# Patient Record
Sex: MALE | Race: WHITE | NOT HISPANIC OR LATINO | Employment: OTHER
[De-identification: names, ages, dates, MRNs, and addresses within clinical notes are randomized per-mention and may not be internally consistent; named-entity substitution may affect disease eponyms.]

---

## 2023-01-17 ENCOUNTER — TELEMEDICINE (OUTPATIENT)
Dept: TELEHEALTH | Facility: TELEMEDICINE | Age: 68
End: 2023-01-17
Payer: MEDICARE

## 2023-01-17 VITALS — WEIGHT: 205 LBS | BODY MASS INDEX: 29.35 KG/M2 | HEIGHT: 70 IN

## 2023-01-17 DIAGNOSIS — Z76.0 MEDICATION REFILL: ICD-10-CM

## 2023-01-17 DIAGNOSIS — U07.1 COVID-19: ICD-10-CM

## 2023-01-17 PROCEDURE — 99203 OFFICE O/P NEW LOW 30 MIN: CPT | Mod: 95 | Performed by: STUDENT IN AN ORGANIZED HEALTH CARE EDUCATION/TRAINING PROGRAM

## 2023-01-17 RX ORDER — ALBUTEROL SULFATE 90 UG/1
2 AEROSOL, METERED RESPIRATORY (INHALATION) EVERY 6 HOURS PRN
Qty: 8.5 G | Refills: 1 | Status: ON HOLD | OUTPATIENT
Start: 2023-01-17 | End: 2023-03-07

## 2023-01-17 RX ORDER — BUDESONIDE AND FORMOTEROL FUMARATE DIHYDRATE 80; 4.5 UG/1; UG/1
2 AEROSOL RESPIRATORY (INHALATION) 2 TIMES DAILY PRN
Qty: 10.2 G | Refills: 0 | Status: ON HOLD | OUTPATIENT
Start: 2023-01-17 | End: 2023-03-07

## 2023-01-17 NOTE — PROGRESS NOTES
"Subjective:   Keyur Fernandez is a 67 y.o. male who presents for Coronavirus Screening (Tested positive sunday)      HPI:  Pleasant 67-year-old male presents to the urgent care via virtual visit for positive COVID-19 diagnosed on 1/15/2023.  Symptoms started that same day.  He reports dry cough, fatigue, intermittent headache, sore throat, and nasal congestion.  He did have a fever the other day of 102.0 °F.  He is taking OTC cold medication.  Fever does respond to to antipyretics.  He also presents for medication refill of albuterol and Symbicort for his asthma.  He states his asthma does tend to flareup with upper respiratory tract infections.  He denies any liver or kidney dysfunction.  He does not take any statins.  The only current prescription medication that he is taking at this time is Keflex for a skin infection.  Denies ear pain, ear discharge, chest pain, palpitations, lower leg swelling, lower leg pain, production cough, shortness of breath, wheezing, nausea, vomiting, abdominal pain, diarrhea, dizziness.      Medications:    albuterol Aers  budesonide-formoterol Aero  Nirmatrelvir&Ritonavir 300/100 Tbpk    Allergies: Patient has no allergy information on record.    Problem List: Keyur Fernandez does not have a problem list on file.    Surgical History:  No past surgical history on file.    Past Social Hx: Keyur Fernandez  reports that he has never smoked. He has never used smokeless tobacco.     Past Family Hx:  Keyur Fernandez family history is not on file.     Problem list, medications, and allergies reviewed by myself today in Epic.     Objective:     Ht 1.778 m (5' 10\")   Wt 93 kg (205 lb)   BMI 29.41 kg/m²     Physical Exam  Vitals reviewed.   Constitutional:       General: He is not in acute distress.     Appearance: Normal appearance.      Comments: Physical exam limited due to virtual visit.   HENT:      Head: Normocephalic.      Right Ear: External ear normal.      Nose: Congestion present. No rhinorrhea.    "   Mouth/Throat:      Mouth: Mucous membranes are moist.   Eyes:      Conjunctiva/sclera: Conjunctivae normal.      Pupils: Pupils are equal, round, and reactive to light.   Pulmonary:      Effort: Pulmonary effort is normal.   Musculoskeletal:      Cervical back: Normal range of motion.   Neurological:      Mental Status: He is alert and oriented to person, place, and time.       Assessment/Plan:     Diagnosis and associated orders:     1. COVID-19  Nirmatrelvir&Ritonavir 300/100 20 x 150 MG & 10 x 100MG Tablet Therapy Pack      2. Medication refill  albuterol 108 (90 Base) MCG/ACT Aero Soln inhalation aerosol    budesonide-formoterol (SYMBICORT) 80-4.5 MCG/ACT Aerosol         Comments/MDM:     Patient presents with positive for COVID-19 that started 2 and half days ago.  Patient has no active prescription medications that would interfere with antivirals.  Denies history of kidney or liver dysfunction.  He is not taking any statins.  Discussed antivirals with Paxlovid.  Patient does have a history of asthma that is typically well controlled.  He is a candidate for this medication.  He would like to pursue antivirals at this time.  We did discuss the risks of this medication and the benefits.  Patient is agreeable to the plan.  No labs available showing contraindication to this medication.  He is aware that this is an emergency authorized medication.  He would also like refills of his albuterol and Symbicort.  Both his medications were prescribed at this time.  Strict ED/return precautions were given.  Patient was advised to present in person for evaluation if he starts experiencing any chest pain, shortness of breath, or worsening of his symptoms despite antivirals.  Patient is agreeable this.       This evaluation was conducted via Zoom using secure and encrypted videoconferencing technology. The patient was in their home in the [facility].    The patient's identity was confirmed and verbal consent was  obtained for this virtual visit.     Differential diagnosis, natural history, supportive care, and indications for immediate follow-up discussed.    Advised the patient to follow-up with the primary care physician for recheck, reevaluation, and consideration of further management.    Please note that this dictation was created using voice recognition software. I have made a reasonable attempt to correct obvious errors, but I expect that there are errors of grammar and possibly content that I did not discover before finalizing the note.    Electronically signed by Filiberto Larson PA-C.

## 2023-02-18 ENCOUNTER — HOSPITAL ENCOUNTER (INPATIENT)
Facility: MEDICAL CENTER | Age: 68
LOS: 1 days | DRG: 661 | End: 2023-02-18
Attending: STUDENT IN AN ORGANIZED HEALTH CARE EDUCATION/TRAINING PROGRAM | Admitting: STUDENT IN AN ORGANIZED HEALTH CARE EDUCATION/TRAINING PROGRAM
Payer: MEDICARE

## 2023-02-18 ENCOUNTER — ANESTHESIA EVENT (OUTPATIENT)
Dept: SURGERY | Facility: MEDICAL CENTER | Age: 68
DRG: 661 | End: 2023-02-18
Payer: MEDICARE

## 2023-02-18 ENCOUNTER — ANESTHESIA (OUTPATIENT)
Dept: SURGERY | Facility: MEDICAL CENTER | Age: 68
DRG: 661 | End: 2023-02-18
Payer: MEDICARE

## 2023-02-18 ENCOUNTER — APPOINTMENT (OUTPATIENT)
Dept: RADIOLOGY | Facility: MEDICAL CENTER | Age: 68
DRG: 661 | End: 2023-02-18
Attending: UROLOGY
Payer: MEDICARE

## 2023-02-18 ENCOUNTER — APPOINTMENT (OUTPATIENT)
Dept: RADIOLOGY | Facility: MEDICAL CENTER | Age: 68
DRG: 661 | End: 2023-02-18
Attending: STUDENT IN AN ORGANIZED HEALTH CARE EDUCATION/TRAINING PROGRAM
Payer: MEDICARE

## 2023-02-18 VITALS
RESPIRATION RATE: 17 BRPM | SYSTOLIC BLOOD PRESSURE: 97 MMHG | TEMPERATURE: 97.5 F | BODY MASS INDEX: 29.98 KG/M2 | OXYGEN SATURATION: 96 % | DIASTOLIC BLOOD PRESSURE: 58 MMHG | HEART RATE: 70 BPM | HEIGHT: 70 IN | WEIGHT: 209.44 LBS

## 2023-02-18 DIAGNOSIS — N17.9 AKI (ACUTE KIDNEY INJURY) (HCC): ICD-10-CM

## 2023-02-18 DIAGNOSIS — N20.1 URETEROLITHIASIS: Primary | ICD-10-CM

## 2023-02-18 DIAGNOSIS — R10.9 LEFT FLANK PAIN: ICD-10-CM

## 2023-02-18 PROBLEM — J30.9 ALLERGIC RHINITIS: Status: ACTIVE | Noted: 2023-02-18

## 2023-02-18 PROBLEM — N13.9 OBSTRUCTIVE UROPATHY: Status: ACTIVE | Noted: 2023-02-18

## 2023-02-18 PROBLEM — K21.9 GERD (GASTROESOPHAGEAL REFLUX DISEASE): Status: ACTIVE | Noted: 2023-02-18

## 2023-02-18 PROBLEM — N20.0 NEPHROLITHIASIS: Status: ACTIVE | Noted: 2023-02-18

## 2023-02-18 PROBLEM — J45.909 ASTHMA: Status: ACTIVE | Noted: 2023-02-18

## 2023-02-18 PROBLEM — D72.829 LEUKOCYTOSIS: Status: ACTIVE | Noted: 2023-02-18

## 2023-02-18 LAB
ALBUMIN SERPL BCP-MCNC: 4.3 G/DL (ref 3.2–4.9)
ALBUMIN SERPL BCP-MCNC: 4.5 G/DL (ref 3.2–4.9)
ALBUMIN/GLOB SERPL: 1.9 G/DL
ALP SERPL-CCNC: 86 U/L (ref 30–99)
ALT SERPL-CCNC: 25 U/L (ref 2–50)
ANION GAP SERPL CALC-SCNC: 14 MMOL/L (ref 7–16)
APPEARANCE UR: CLEAR
AST SERPL-CCNC: 20 U/L (ref 12–45)
BACTERIA #/AREA URNS HPF: NEGATIVE /HPF
BASOPHILS # BLD AUTO: 0.3 % (ref 0–1.8)
BASOPHILS # BLD: 0.04 K/UL (ref 0–0.12)
BILIRUB SERPL-MCNC: 0.5 MG/DL (ref 0.1–1.5)
BILIRUB UR QL STRIP.AUTO: NEGATIVE
BUN SERPL-MCNC: 21 MG/DL (ref 8–22)
BUN SERPL-MCNC: 22 MG/DL (ref 8–22)
CALCIUM ALBUM COR SERPL-MCNC: 8.8 MG/DL (ref 8.5–10.5)
CALCIUM ALBUM COR SERPL-MCNC: 8.9 MG/DL (ref 8.5–10.5)
CALCIUM SERPL-MCNC: 9.1 MG/DL (ref 8.5–10.5)
CALCIUM SERPL-MCNC: 9.2 MG/DL (ref 8.5–10.5)
CHLORIDE SERPL-SCNC: 105 MMOL/L (ref 96–112)
CHLORIDE SERPL-SCNC: 106 MMOL/L (ref 96–112)
CO2 SERPL-SCNC: 19 MMOL/L (ref 20–33)
CO2 SERPL-SCNC: 23 MMOL/L (ref 20–33)
COLOR UR: YELLOW
CREAT SERPL-MCNC: 1.8 MG/DL (ref 0.5–1.4)
CREAT SERPL-MCNC: 2.05 MG/DL (ref 0.5–1.4)
CRP SERPL HS-MCNC: 0.51 MG/DL (ref 0–0.75)
EOSINOPHIL # BLD AUTO: 0.05 K/UL (ref 0–0.51)
EOSINOPHIL NFR BLD: 0.3 % (ref 0–6.9)
EPI CELLS #/AREA URNS HPF: NEGATIVE /HPF
ERYTHROCYTE [DISTWIDTH] IN BLOOD BY AUTOMATED COUNT: 44.9 FL (ref 35.9–50)
GFR SERPLBLD CREATININE-BSD FMLA CKD-EPI: 35 ML/MIN/1.73 M 2
GFR SERPLBLD CREATININE-BSD FMLA CKD-EPI: 41 ML/MIN/1.73 M 2
GLOBULIN SER CALC-MCNC: 2.3 G/DL (ref 1.9–3.5)
GLUCOSE SERPL-MCNC: 130 MG/DL (ref 65–99)
GLUCOSE SERPL-MCNC: 135 MG/DL (ref 65–99)
GLUCOSE UR STRIP.AUTO-MCNC: NEGATIVE MG/DL
HCT VFR BLD AUTO: 44.8 % (ref 42–52)
HGB BLD-MCNC: 15.5 G/DL (ref 14–18)
HYALINE CASTS #/AREA URNS LPF: ABNORMAL /LPF
IMM GRANULOCYTES # BLD AUTO: 0.08 K/UL (ref 0–0.11)
IMM GRANULOCYTES NFR BLD AUTO: 0.5 % (ref 0–0.9)
KETONES UR STRIP.AUTO-MCNC: 15 MG/DL
LEUKOCYTE ESTERASE UR QL STRIP.AUTO: NEGATIVE
LIPASE SERPL-CCNC: 33 U/L (ref 11–82)
LYMPHOCYTES # BLD AUTO: 1.13 K/UL (ref 1–4.8)
LYMPHOCYTES NFR BLD: 7.4 % (ref 22–41)
MAGNESIUM SERPL-MCNC: 2 MG/DL (ref 1.5–2.5)
MCH RBC QN AUTO: 33.4 PG (ref 27–33)
MCHC RBC AUTO-ENTMCNC: 34.6 G/DL (ref 33.7–35.3)
MCV RBC AUTO: 96.6 FL (ref 81.4–97.8)
MICRO URNS: ABNORMAL
MONOCYTES # BLD AUTO: 0.71 K/UL (ref 0–0.85)
MONOCYTES NFR BLD AUTO: 4.7 % (ref 0–13.4)
NEUTROPHILS # BLD AUTO: 13.19 K/UL (ref 1.82–7.42)
NEUTROPHILS NFR BLD: 86.8 % (ref 44–72)
NITRITE UR QL STRIP.AUTO: NEGATIVE
NRBC # BLD AUTO: 0 K/UL
NRBC BLD-RTO: 0 /100 WBC
PH UR STRIP.AUTO: 5.5 [PH] (ref 5–8)
PHOSPHATE SERPL-MCNC: 2.8 MG/DL (ref 2.5–4.5)
PLATELET # BLD AUTO: 196 K/UL (ref 164–446)
PMV BLD AUTO: 10.7 FL (ref 9–12.9)
POTASSIUM SERPL-SCNC: 4.1 MMOL/L (ref 3.6–5.5)
POTASSIUM SERPL-SCNC: 4.1 MMOL/L (ref 3.6–5.5)
PROCALCITONIN SERPL-MCNC: 0.05 NG/ML
PROT SERPL-MCNC: 6.6 G/DL (ref 6–8.2)
PROT UR QL STRIP: NEGATIVE MG/DL
RBC # BLD AUTO: 4.64 M/UL (ref 4.7–6.1)
RBC # URNS HPF: ABNORMAL /HPF
RBC UR QL AUTO: ABNORMAL
SODIUM SERPL-SCNC: 141 MMOL/L (ref 135–145)
SODIUM SERPL-SCNC: 142 MMOL/L (ref 135–145)
SP GR UR STRIP.AUTO: 1.03
UROBILINOGEN UR STRIP.AUTO-MCNC: 0.2 MG/DL
WBC # BLD AUTO: 15.2 K/UL (ref 4.8–10.8)
WBC #/AREA URNS HPF: ABNORMAL /HPF

## 2023-02-18 PROCEDURE — 99235 HOSP IP/OBS SAME DATE MOD 70: CPT | Performed by: STUDENT IN AN ORGANIZED HEALTH CARE EDUCATION/TRAINING PROGRAM

## 2023-02-18 PROCEDURE — 700102 HCHG RX REV CODE 250 W/ 637 OVERRIDE(OP): Performed by: STUDENT IN AN ORGANIZED HEALTH CARE EDUCATION/TRAINING PROGRAM

## 2023-02-18 PROCEDURE — 96374 THER/PROPH/DIAG INJ IV PUSH: CPT

## 2023-02-18 PROCEDURE — 36415 COLL VENOUS BLD VENIPUNCTURE: CPT

## 2023-02-18 PROCEDURE — A9270 NON-COVERED ITEM OR SERVICE: HCPCS | Performed by: STUDENT IN AN ORGANIZED HEALTH CARE EDUCATION/TRAINING PROGRAM

## 2023-02-18 PROCEDURE — 700111 HCHG RX REV CODE 636 W/ 250 OVERRIDE (IP): Performed by: STUDENT IN AN ORGANIZED HEALTH CARE EDUCATION/TRAINING PROGRAM

## 2023-02-18 PROCEDURE — 160009 HCHG ANES TIME/MIN: Performed by: UROLOGY

## 2023-02-18 PROCEDURE — 160028 HCHG SURGERY MINUTES - 1ST 30 MINS LEVEL 3: Performed by: UROLOGY

## 2023-02-18 PROCEDURE — 700105 HCHG RX REV CODE 258: Performed by: STUDENT IN AN ORGANIZED HEALTH CARE EDUCATION/TRAINING PROGRAM

## 2023-02-18 PROCEDURE — 160002 HCHG RECOVERY MINUTES (STAT): Performed by: UROLOGY

## 2023-02-18 PROCEDURE — 84145 PROCALCITONIN (PCT): CPT

## 2023-02-18 PROCEDURE — 80069 RENAL FUNCTION PANEL: CPT

## 2023-02-18 PROCEDURE — 160039 HCHG SURGERY MINUTES - EA ADDL 1 MIN LEVEL 3: Performed by: UROLOGY

## 2023-02-18 PROCEDURE — 85025 COMPLETE CBC W/AUTO DIFF WBC: CPT

## 2023-02-18 PROCEDURE — 00910 ANES TRANSURETHRAL PX NOS: CPT | Performed by: STUDENT IN AN ORGANIZED HEALTH CARE EDUCATION/TRAINING PROGRAM

## 2023-02-18 PROCEDURE — 87086 URINE CULTURE/COLONY COUNT: CPT

## 2023-02-18 PROCEDURE — C1747 HCHG SHELL REV 278 C1747: HCPCS | Performed by: UROLOGY

## 2023-02-18 PROCEDURE — 99285 EMERGENCY DEPT VISIT HI MDM: CPT

## 2023-02-18 PROCEDURE — 700101 HCHG RX REV CODE 250: Performed by: STUDENT IN AN ORGANIZED HEALTH CARE EDUCATION/TRAINING PROGRAM

## 2023-02-18 PROCEDURE — 74176 CT ABD & PELVIS W/O CONTRAST: CPT

## 2023-02-18 PROCEDURE — 160048 HCHG OR STATISTICAL LEVEL 1-5: Performed by: UROLOGY

## 2023-02-18 PROCEDURE — 0T778DZ DILATION OF LEFT URETER WITH INTRALUMINAL DEVICE, VIA NATURAL OR ARTIFICIAL OPENING ENDOSCOPIC: ICD-10-PCS | Performed by: UROLOGY

## 2023-02-18 PROCEDURE — 160035 HCHG PACU - 1ST 60 MINS PHASE I: Performed by: UROLOGY

## 2023-02-18 PROCEDURE — 770006 HCHG ROOM/CARE - MED/SURG/GYN SEMI*

## 2023-02-18 PROCEDURE — 83735 ASSAY OF MAGNESIUM: CPT

## 2023-02-18 PROCEDURE — 86140 C-REACTIVE PROTEIN: CPT

## 2023-02-18 PROCEDURE — C1769 GUIDE WIRE: HCPCS | Performed by: UROLOGY

## 2023-02-18 PROCEDURE — 83690 ASSAY OF LIPASE: CPT

## 2023-02-18 PROCEDURE — 96376 TX/PRO/DX INJ SAME DRUG ADON: CPT

## 2023-02-18 PROCEDURE — 96375 TX/PRO/DX INJ NEW DRUG ADDON: CPT

## 2023-02-18 PROCEDURE — C2617 STENT, NON-COR, TEM W/O DEL: HCPCS | Performed by: UROLOGY

## 2023-02-18 PROCEDURE — 81001 URINALYSIS AUTO W/SCOPE: CPT

## 2023-02-18 PROCEDURE — 80053 COMPREHEN METABOLIC PANEL: CPT

## 2023-02-18 DEVICE — STENT UROLOGICAL POLARIS 6X26  ULTRA: Type: IMPLANTABLE DEVICE | Site: URETER | Status: FUNCTIONAL

## 2023-02-18 RX ORDER — ALBUTEROL SULFATE 90 UG/1
AEROSOL, METERED RESPIRATORY (INHALATION) PRN
Status: DISCONTINUED | OUTPATIENT
Start: 2023-02-18 | End: 2023-02-18 | Stop reason: SURG

## 2023-02-18 RX ORDER — IPRATROPIUM BROMIDE AND ALBUTEROL SULFATE 2.5; .5 MG/3ML; MG/3ML
3 SOLUTION RESPIRATORY (INHALATION)
Status: DISCONTINUED | OUTPATIENT
Start: 2023-02-18 | End: 2023-02-18 | Stop reason: HOSPADM

## 2023-02-18 RX ORDER — LABETALOL HYDROCHLORIDE 5 MG/ML
10 INJECTION, SOLUTION INTRAVENOUS EVERY 4 HOURS PRN
Status: DISCONTINUED | OUTPATIENT
Start: 2023-02-18 | End: 2023-02-18 | Stop reason: HOSPADM

## 2023-02-18 RX ORDER — GUAIFENESIN/DEXTROMETHORPHAN 100-10MG/5
10 SYRUP ORAL EVERY 6 HOURS PRN
Status: DISCONTINUED | OUTPATIENT
Start: 2023-02-18 | End: 2023-02-18 | Stop reason: HOSPADM

## 2023-02-18 RX ORDER — ESOMEPRAZOLE MAGNESIUM 10 MG/1
1 GRANULE, FOR SUSPENSION, EXTENDED RELEASE ORAL EVERY EVENING
COMMUNITY

## 2023-02-18 RX ORDER — SODIUM CHLORIDE 9 MG/ML
2000 INJECTION, SOLUTION INTRAVENOUS CONTINUOUS
Status: DISCONTINUED | OUTPATIENT
Start: 2023-02-18 | End: 2023-02-18 | Stop reason: HOSPADM

## 2023-02-18 RX ORDER — ONDANSETRON 2 MG/ML
4 INJECTION INTRAMUSCULAR; INTRAVENOUS ONCE
Status: COMPLETED | OUTPATIENT
Start: 2023-02-18 | End: 2023-02-18

## 2023-02-18 RX ORDER — HYDROMORPHONE HYDROCHLORIDE 1 MG/ML
0.1 INJECTION, SOLUTION INTRAMUSCULAR; INTRAVENOUS; SUBCUTANEOUS
Status: DISCONTINUED | OUTPATIENT
Start: 2023-02-18 | End: 2023-02-18 | Stop reason: HOSPADM

## 2023-02-18 RX ORDER — CEPHALEXIN 500 MG/1
500 CAPSULE ORAL 2 TIMES DAILY
COMMUNITY
Start: 2022-12-20 | End: 2023-03-01

## 2023-02-18 RX ORDER — OXYCODONE HCL 5 MG/5 ML
10 SOLUTION, ORAL ORAL
Status: DISCONTINUED | OUTPATIENT
Start: 2023-02-18 | End: 2023-02-18 | Stop reason: HOSPADM

## 2023-02-18 RX ORDER — OXYCODONE HCL 5 MG/5 ML
5 SOLUTION, ORAL ORAL
Status: DISCONTINUED | OUTPATIENT
Start: 2023-02-18 | End: 2023-02-18 | Stop reason: HOSPADM

## 2023-02-18 RX ORDER — OXYCODONE HYDROCHLORIDE 5 MG/1
5 TABLET ORAL
Status: DISCONTINUED | OUTPATIENT
Start: 2023-02-18 | End: 2023-02-18 | Stop reason: HOSPADM

## 2023-02-18 RX ORDER — ALBUTEROL SULFATE 90 UG/1
2 AEROSOL, METERED RESPIRATORY (INHALATION) EVERY 6 HOURS PRN
Status: DISCONTINUED | OUTPATIENT
Start: 2023-02-18 | End: 2023-02-18 | Stop reason: HOSPADM

## 2023-02-18 RX ORDER — OXYBUTYNIN CHLORIDE 10 MG/1
10 TABLET, EXTENDED RELEASE ORAL
Qty: 14 TABLET | Refills: 0 | Status: ON HOLD | OUTPATIENT
Start: 2023-02-18 | End: 2023-03-07

## 2023-02-18 RX ORDER — SODIUM CHLORIDE 9 MG/ML
1000 INJECTION, SOLUTION INTRAVENOUS ONCE
Status: COMPLETED | OUTPATIENT
Start: 2023-02-18 | End: 2023-02-18

## 2023-02-18 RX ORDER — HYDROMORPHONE HYDROCHLORIDE 1 MG/ML
0.5 INJECTION, SOLUTION INTRAMUSCULAR; INTRAVENOUS; SUBCUTANEOUS
Status: DISCONTINUED | OUTPATIENT
Start: 2023-02-18 | End: 2023-02-18 | Stop reason: HOSPADM

## 2023-02-18 RX ORDER — OXYCODONE HYDROCHLORIDE 10 MG/1
10 TABLET ORAL
Status: DISCONTINUED | OUTPATIENT
Start: 2023-02-18 | End: 2023-02-18 | Stop reason: HOSPADM

## 2023-02-18 RX ORDER — HYDROMORPHONE HYDROCHLORIDE 1 MG/ML
1 INJECTION, SOLUTION INTRAMUSCULAR; INTRAVENOUS; SUBCUTANEOUS ONCE
Status: COMPLETED | OUTPATIENT
Start: 2023-02-18 | End: 2023-02-18

## 2023-02-18 RX ORDER — CEFAZOLIN SODIUM 1 G/3ML
INJECTION, POWDER, FOR SOLUTION INTRAMUSCULAR; INTRAVENOUS PRN
Status: DISCONTINUED | OUTPATIENT
Start: 2023-02-18 | End: 2023-02-18 | Stop reason: SURG

## 2023-02-18 RX ORDER — HYDROMORPHONE HYDROCHLORIDE 1 MG/ML
0.2 INJECTION, SOLUTION INTRAMUSCULAR; INTRAVENOUS; SUBCUTANEOUS
Status: DISCONTINUED | OUTPATIENT
Start: 2023-02-18 | End: 2023-02-18 | Stop reason: HOSPADM

## 2023-02-18 RX ORDER — AMOXICILLIN 250 MG
2 CAPSULE ORAL 2 TIMES DAILY
Status: DISCONTINUED | OUTPATIENT
Start: 2023-02-18 | End: 2023-02-18 | Stop reason: HOSPADM

## 2023-02-18 RX ORDER — HYDROMORPHONE HYDROCHLORIDE 1 MG/ML
0.5 INJECTION, SOLUTION INTRAMUSCULAR; INTRAVENOUS; SUBCUTANEOUS ONCE
Status: COMPLETED | OUTPATIENT
Start: 2023-02-18 | End: 2023-02-18

## 2023-02-18 RX ORDER — TADALAFIL 5 MG/1
5 TABLET ORAL PRN
COMMUNITY
Start: 2023-01-14

## 2023-02-18 RX ORDER — TAMSULOSIN HYDROCHLORIDE 0.4 MG/1
0.4 CAPSULE ORAL 2 TIMES DAILY
Status: DISCONTINUED | OUTPATIENT
Start: 2023-02-18 | End: 2023-02-18 | Stop reason: HOSPADM

## 2023-02-18 RX ORDER — SODIUM CHLORIDE, SODIUM LACTATE, POTASSIUM CHLORIDE, AND CALCIUM CHLORIDE .6; .31; .03; .02 G/100ML; G/100ML; G/100ML; G/100ML
500 INJECTION, SOLUTION INTRAVENOUS
Status: DISCONTINUED | OUTPATIENT
Start: 2023-02-18 | End: 2023-02-18 | Stop reason: HOSPADM

## 2023-02-18 RX ORDER — LIDOCAINE HYDROCHLORIDE 20 MG/ML
INJECTION, SOLUTION EPIDURAL; INFILTRATION; INTRACAUDAL; PERINEURAL PRN
Status: DISCONTINUED | OUTPATIENT
Start: 2023-02-18 | End: 2023-02-18 | Stop reason: SURG

## 2023-02-18 RX ORDER — OXYCODONE HYDROCHLORIDE 5 MG/1
5-10 TABLET ORAL EVERY 6 HOURS PRN
Qty: 25 TABLET | Refills: 0 | Status: SHIPPED | OUTPATIENT
Start: 2023-02-18 | End: 2023-02-23

## 2023-02-18 RX ORDER — BISACODYL 10 MG
10 SUPPOSITORY, RECTAL RECTAL
Status: DISCONTINUED | OUTPATIENT
Start: 2023-02-18 | End: 2023-02-18 | Stop reason: HOSPADM

## 2023-02-18 RX ORDER — ACETAMINOPHEN 325 MG/1
650 TABLET ORAL EVERY 6 HOURS PRN
Status: DISCONTINUED | OUTPATIENT
Start: 2023-02-18 | End: 2023-02-18 | Stop reason: HOSPADM

## 2023-02-18 RX ORDER — PHENAZOPYRIDINE HYDROCHLORIDE 100 MG/1
100 TABLET, FILM COATED ORAL 3 TIMES DAILY PRN
Qty: 6 TABLET | Refills: 0 | Status: ON HOLD | COMMUNITY
End: 2023-03-07

## 2023-02-18 RX ORDER — HALOPERIDOL 5 MG/ML
1 INJECTION INTRAMUSCULAR
Status: DISCONTINUED | OUTPATIENT
Start: 2023-02-18 | End: 2023-02-18 | Stop reason: HOSPADM

## 2023-02-18 RX ORDER — ONDANSETRON 4 MG/1
4 TABLET, ORALLY DISINTEGRATING ORAL EVERY 4 HOURS PRN
Status: DISCONTINUED | OUTPATIENT
Start: 2023-02-18 | End: 2023-02-18 | Stop reason: HOSPADM

## 2023-02-18 RX ORDER — POLYETHYLENE GLYCOL 3350 17 G/17G
17 POWDER, FOR SOLUTION ORAL DAILY
Qty: 14 EACH | Refills: 0 | Status: ON HOLD | COMMUNITY
End: 2023-03-07

## 2023-02-18 RX ORDER — DOCUSATE SODIUM 100 MG/1
100 CAPSULE, LIQUID FILLED ORAL 2 TIMES DAILY
Qty: 30 CAPSULE | Refills: 0 | COMMUNITY

## 2023-02-18 RX ORDER — ONDANSETRON 2 MG/ML
INJECTION INTRAMUSCULAR; INTRAVENOUS PRN
Status: DISCONTINUED | OUTPATIENT
Start: 2023-02-18 | End: 2023-02-18 | Stop reason: SURG

## 2023-02-18 RX ORDER — FEXOFENADINE HCL 60 MG/1
60 TABLET, FILM COATED ORAL DAILY
Status: DISCONTINUED | OUTPATIENT
Start: 2023-02-18 | End: 2023-02-18 | Stop reason: HOSPADM

## 2023-02-18 RX ORDER — BUDESONIDE AND FORMOTEROL FUMARATE DIHYDRATE 80; 4.5 UG/1; UG/1
2 AEROSOL RESPIRATORY (INHALATION) 2 TIMES DAILY PRN
Status: DISCONTINUED | OUTPATIENT
Start: 2023-02-18 | End: 2023-02-18 | Stop reason: HOSPADM

## 2023-02-18 RX ORDER — KETOROLAC TROMETHAMINE 30 MG/ML
15 INJECTION, SOLUTION INTRAMUSCULAR; INTRAVENOUS ONCE
Status: COMPLETED | OUTPATIENT
Start: 2023-02-18 | End: 2023-02-18

## 2023-02-18 RX ORDER — DIPHENHYDRAMINE HYDROCHLORIDE 50 MG/ML
12.5 INJECTION INTRAMUSCULAR; INTRAVENOUS
Status: DISCONTINUED | OUTPATIENT
Start: 2023-02-18 | End: 2023-02-18 | Stop reason: HOSPADM

## 2023-02-18 RX ORDER — POLYETHYLENE GLYCOL 3350 17 G/17G
1 POWDER, FOR SOLUTION ORAL
Status: DISCONTINUED | OUTPATIENT
Start: 2023-02-18 | End: 2023-02-18 | Stop reason: HOSPADM

## 2023-02-18 RX ORDER — DEXAMETHASONE SODIUM PHOSPHATE 4 MG/ML
INJECTION, SOLUTION INTRA-ARTICULAR; INTRALESIONAL; INTRAMUSCULAR; INTRAVENOUS; SOFT TISSUE PRN
Status: DISCONTINUED | OUTPATIENT
Start: 2023-02-18 | End: 2023-02-18 | Stop reason: SURG

## 2023-02-18 RX ORDER — ONDANSETRON 2 MG/ML
4 INJECTION INTRAMUSCULAR; INTRAVENOUS
Status: DISCONTINUED | OUTPATIENT
Start: 2023-02-18 | End: 2023-02-18 | Stop reason: HOSPADM

## 2023-02-18 RX ORDER — SODIUM CHLORIDE, SODIUM LACTATE, POTASSIUM CHLORIDE, CALCIUM CHLORIDE 600; 310; 30; 20 MG/100ML; MG/100ML; MG/100ML; MG/100ML
INJECTION, SOLUTION INTRAVENOUS CONTINUOUS
Status: DISCONTINUED | OUTPATIENT
Start: 2023-02-18 | End: 2023-02-18 | Stop reason: HOSPADM

## 2023-02-18 RX ORDER — OMEPRAZOLE 20 MG/1
40 CAPSULE, DELAYED RELEASE ORAL DAILY
Status: DISCONTINUED | OUTPATIENT
Start: 2023-02-18 | End: 2023-02-18 | Stop reason: HOSPADM

## 2023-02-18 RX ORDER — FEXOFENADINE HCL 60 MG/1
60 TABLET, FILM COATED ORAL DAILY
COMMUNITY

## 2023-02-18 RX ORDER — SODIUM CHLORIDE, SODIUM LACTATE, POTASSIUM CHLORIDE, CALCIUM CHLORIDE 600; 310; 30; 20 MG/100ML; MG/100ML; MG/100ML; MG/100ML
INJECTION, SOLUTION INTRAVENOUS
Status: DISCONTINUED | OUTPATIENT
Start: 2023-02-18 | End: 2023-02-18 | Stop reason: SURG

## 2023-02-18 RX ORDER — TAMSULOSIN HYDROCHLORIDE 0.4 MG/1
0.4 CAPSULE ORAL DAILY
Qty: 30 CAPSULE | Refills: 1 | Status: SHIPPED | OUTPATIENT
Start: 2023-02-18

## 2023-02-18 RX ORDER — SODIUM CHLORIDE 9 MG/ML
INJECTION, SOLUTION INTRAVENOUS CONTINUOUS
Status: DISCONTINUED | OUTPATIENT
Start: 2023-02-18 | End: 2023-02-18

## 2023-02-18 RX ORDER — ONDANSETRON 2 MG/ML
4 INJECTION INTRAMUSCULAR; INTRAVENOUS EVERY 4 HOURS PRN
Status: DISCONTINUED | OUTPATIENT
Start: 2023-02-18 | End: 2023-02-18 | Stop reason: HOSPADM

## 2023-02-18 RX ORDER — HYDROMORPHONE HYDROCHLORIDE 1 MG/ML
0.4 INJECTION, SOLUTION INTRAMUSCULAR; INTRAVENOUS; SUBCUTANEOUS
Status: DISCONTINUED | OUTPATIENT
Start: 2023-02-18 | End: 2023-02-18 | Stop reason: HOSPADM

## 2023-02-18 RX ADMIN — SENNOSIDES AND DOCUSATE SODIUM 2 TABLET: 50; 8.6 TABLET ORAL at 07:43

## 2023-02-18 RX ADMIN — OMEPRAZOLE 40 MG: 20 CAPSULE, DELAYED RELEASE ORAL at 07:43

## 2023-02-18 RX ADMIN — FEXOFENADINE HCL 60 MG: 60 TABLET, FILM COATED ORAL at 07:43

## 2023-02-18 RX ADMIN — ONDANSETRON 4 MG: 2 INJECTION INTRAMUSCULAR; INTRAVENOUS at 02:16

## 2023-02-18 RX ADMIN — TAMSULOSIN HYDROCHLORIDE 0.4 MG: 0.4 CAPSULE ORAL at 07:42

## 2023-02-18 RX ADMIN — OXYCODONE HYDROCHLORIDE 10 MG: 10 TABLET ORAL at 10:26

## 2023-02-18 RX ADMIN — FENTANYL CITRATE 100 MCG: 50 INJECTION, SOLUTION INTRAMUSCULAR; INTRAVENOUS at 12:03

## 2023-02-18 RX ADMIN — PROPOFOL 180 MG: 10 INJECTION, EMULSION INTRAVENOUS at 12:03

## 2023-02-18 RX ADMIN — ROCURONIUM BROMIDE 50 MG: 10 INJECTION, SOLUTION INTRAVENOUS at 12:03

## 2023-02-18 RX ADMIN — SENNOSIDES AND DOCUSATE SODIUM 2 TABLET: 50; 8.6 TABLET ORAL at 17:35

## 2023-02-18 RX ADMIN — LIDOCAINE HYDROCHLORIDE 100 MG: 20 INJECTION, SOLUTION EPIDURAL; INFILTRATION; INTRACAUDAL at 12:03

## 2023-02-18 RX ADMIN — SODIUM CHLORIDE 1000 ML: 9 INJECTION, SOLUTION INTRAVENOUS at 07:29

## 2023-02-18 RX ADMIN — HYDROMORPHONE HYDROCHLORIDE 1 MG: 1 INJECTION, SOLUTION INTRAMUSCULAR; INTRAVENOUS; SUBCUTANEOUS at 02:17

## 2023-02-18 RX ADMIN — SODIUM CHLORIDE, POTASSIUM CHLORIDE, SODIUM LACTATE AND CALCIUM CHLORIDE: 600; 310; 30; 20 INJECTION, SOLUTION INTRAVENOUS at 11:58

## 2023-02-18 RX ADMIN — ONDANSETRON 4 MG: 2 INJECTION INTRAMUSCULAR; INTRAVENOUS at 12:42

## 2023-02-18 RX ADMIN — HYDROMORPHONE HYDROCHLORIDE 0.5 MG: 1 INJECTION, SOLUTION INTRAMUSCULAR; INTRAVENOUS; SUBCUTANEOUS at 04:53

## 2023-02-18 RX ADMIN — SUGAMMADEX 200 MG: 100 INJECTION, SOLUTION INTRAVENOUS at 12:41

## 2023-02-18 RX ADMIN — TAMSULOSIN HYDROCHLORIDE 0.4 MG: 0.4 CAPSULE ORAL at 17:35

## 2023-02-18 RX ADMIN — SODIUM CHLORIDE 1000 ML: 9 INJECTION, SOLUTION INTRAVENOUS at 04:19

## 2023-02-18 RX ADMIN — SODIUM CHLORIDE 1000 ML: 9 INJECTION, SOLUTION INTRAVENOUS at 02:20

## 2023-02-18 RX ADMIN — DEXAMETHASONE SODIUM PHOSPHATE 4 MG: 4 INJECTION, SOLUTION INTRA-ARTICULAR; INTRALESIONAL; INTRAMUSCULAR; INTRAVENOUS; SOFT TISSUE at 12:08

## 2023-02-18 RX ADMIN — ALBUTEROL SULFATE 4 PUFF: 90 AEROSOL, METERED RESPIRATORY (INHALATION) at 12:43

## 2023-02-18 RX ADMIN — ROCURONIUM BROMIDE 10 MG: 10 INJECTION, SOLUTION INTRAVENOUS at 12:27

## 2023-02-18 RX ADMIN — KETOROLAC TROMETHAMINE 15 MG: 30 INJECTION, SOLUTION INTRAMUSCULAR; INTRAVENOUS at 02:17

## 2023-02-18 RX ADMIN — CEFAZOLIN 2 G: 330 INJECTION, POWDER, FOR SOLUTION INTRAMUSCULAR; INTRAVENOUS at 12:08

## 2023-02-18 ASSESSMENT — ENCOUNTER SYMPTOMS
ABDOMINAL PAIN: 1
DIZZINESS: 0
SHORTNESS OF BREATH: 0
VOMITING: 1
FEVER: 0
ABDOMINAL PAIN: 0
DEPRESSION: 0
VOMITING: 0
FLANK PAIN: 1
HEARTBURN: 0
BRUISES/BLEEDS EASILY: 0
BACK PAIN: 1
MYALGIAS: 0
NAUSEA: 1
COUGH: 0
CHILLS: 0
WEAKNESS: 1
BLURRED VISION: 0
DOUBLE VISION: 0
HEADACHES: 0

## 2023-02-18 ASSESSMENT — PAIN DESCRIPTION - PAIN TYPE
TYPE: ACUTE PAIN
TYPE: ACUTE PAIN
TYPE: SURGICAL PAIN
TYPE: SURGICAL PAIN
TYPE: ACUTE PAIN
TYPE: SURGICAL PAIN
TYPE: SURGICAL PAIN

## 2023-02-18 ASSESSMENT — LIFESTYLE VARIABLES: SUBSTANCE_ABUSE: 0

## 2023-02-18 ASSESSMENT — FIBROSIS 4 INDEX: FIB4 SCORE: 1.346938775510204082

## 2023-02-18 NOTE — PROGRESS NOTES
[facility] Medicine Daily Progress Note    Date of Service  2/18/2023    Chief Complaint  Keyur Fernandez is a 67 y.o. male admitted 2/18/2023 with left-sided flank pain    Hospital Course  Keyur Fernandez is a 67 y.o. male with history of multiple nephrolithiasis, GERD, allergic rhinitis who presented 2/18/2023 with evaluation for left-sided flank pain.  Patient reported having intermittent left groin and left flank discomfort for the past 2 days.  He endorsed intermittent hematuria.  Patient reported having recurrent nephrolithiasis, due to this, became concerned and came to ER for evaluation.  In ER, noted to have creatinine of 2.05 with CTAP noted left nephrolithiasis with 3.2 mm left UPJ stone with obstructive changes.  Urology was consulted by ERP, tentative plan for intervention.  Admitted to medicine service for further evaluation and treatment.    Interval Problem Update  Patient was seen and examined at bedside.  Agree with the plan as written in the H&P.  Case discussed with urology they are planning on doing a ureteroscopy.  Patient reports that his pain is controlled at this time.  He reports he had a total of approximately 12 kidney stones.  They have previously been calcium oxalate in the past.    I have discussed this patient's plan of care and discharge plan at IDT rounds today with Case Management, Nursing, Nursing leadership, and other members of the IDT team.    Consultants/Specialty  urology    Code Status  Full Code    Disposition  Patient is not medically cleared for discharge.   Anticipate discharge to to home with close outpatient follow-up.  I have placed the appropriate orders for post-discharge needs.    Review of Systems  Review of Systems   Constitutional:  Negative for chills and fever.   Genitourinary:  Positive for flank pain.      Physical Exam  Temp:  [36.4 °C (97.5 °F)-36.6 °C (97.9 °F)] 36.5 °C (97.7 °F)  Pulse:  [72-83] 78  Resp:  [14-17] 16  BP: ()/() 128/78  SpO2:  [91 %-97  %] 93 %    Physical Exam  Constitutional:       General: He is not in acute distress.     Appearance: He is not ill-appearing.   Eyes:      General: No scleral icterus.  Cardiovascular:      Rate and Rhythm: Normal rate and regular rhythm.   Pulmonary:      Effort: No respiratory distress.      Breath sounds: Normal breath sounds. No wheezing.   Abdominal:      General: There is no distension.      Tenderness: There is no abdominal tenderness. There is no right CVA tenderness, left CVA tenderness or guarding.   Skin:     General: Skin is dry.      Coloration: Skin is not jaundiced.       Fluids    Intake/Output Summary (Last 24 hours) at 2/18/2023 1226  Last data filed at 2/18/2023 1218  Gross per 24 hour   Intake 1400 ml   Output --   Net 1400 ml       Laboratory  Recent Labs     02/18/23  0039   WBC 15.2*   RBC 4.64*   HEMOGLOBIN 15.5   HEMATOCRIT 44.8   MCV 96.6   MCH 33.4*   MCHC 34.6   RDW 44.9   PLATELETCT 196   MPV 10.7     Recent Labs     02/18/23  0039   SODIUM 141  142   POTASSIUM 4.1  4.1   CHLORIDE 106  105   CO2 19*  23   GLUCOSE 130*  135*   BUN 22  21   CREATININE 1.80*  2.05*   CALCIUM 9.2  9.1                   Imaging  CT-RENAL COLIC EVALUATION(A/P W/O)   Final Result         1.  Left nephrolithiasis with 3.2 mm left ureteropelvic junction stone is also thickening and urinary tract obstructive changes.   2.  Fluid tracking along the left ureter within the retroperitoneum, could represent ureteral or calyceal rupture.   3.  Intermediate density right renal cystic lesion, could represent proteinaceous cyst otherwise indeterminate. Recommend follow-up 3 phase CT of the kidneys or MRI of the kidneys with contrast for further characterization   4.  Fat-containing small left inguinal hernia   5.  Fat-containing umbilical hernia   6.  Diverticulosis           Assessment/Plan  * Obstructive uropathy- (present on admission)  Assessment & Plan  CTAP: left nephrolithiasis with 3.2 mm left UPJ stone  with obstructive changes  IVF  Supportive antiemetic and pain control  Trial of Flomax  Urology consulted  Patient n.p.o. for ureteroscopy    Asthma  Assessment & Plan  Not in acute exacerbation  Continue home Symbicort, PRN albuterol    Leukocytosis  Assessment & Plan  Likely reactive  IVF  Follow-up UA, urine culture    Allergic rhinitis  Assessment & Plan  Allegra    GERD (gastroesophageal reflux disease)  Assessment & Plan  PPI    Nephrolithiasis  Assessment & Plan  As noted above    LUDMILA (acute kidney injury) (HCC)  Assessment & Plan  Suspect secondary to above  IVF  F/u FeNa  Minimize nephrotoxic agents, renally dose meds         VTE prophylaxis: SCDs/TEDs    I have performed a physical exam and reviewed and updated ROS and Plan today (2/18/2023). In review of yesterday's note (2/17/2023), there are no changes except as documented above.

## 2023-02-18 NOTE — ASSESSMENT & PLAN NOTE
CTAP: left nephrolithiasis with 3.2 mm left UPJ stone with obstructive changes  IVF  Supportive antiemetic and pain control  Trial of Flomax  Urology consulted  Patient n.p.o. for ureteroscopy

## 2023-02-18 NOTE — ANESTHESIA TIME REPORT
Anesthesia Start and Stop Event Times     Date Time Event    2/18/2023 1142 Ready for Procedure     1158 Anesthesia Start     1253 Anesthesia Stop        Responsible Staff  02/18/23    Name Role Begin End    Jabier Villasenor M.D. Anesth 1158 1253        Overtime Reason:  no overtime (within assigned shift)    Comments:

## 2023-02-18 NOTE — ANESTHESIA PROCEDURE NOTES
Airway    Date/Time: 2/18/2023 12:06 PM  Performed by: Jabier Villasenor M.D.  Authorized by: Jabier Villasenor M.D.     Location:  OR  Urgency:  Elective  Indications for Airway Management:  Anesthesia      Spontaneous Ventilation: absent    Sedation Level:  Deep  Preoxygenated: Yes    Patient Position:  Sniffing  Final Airway Type:  Endotracheal airway  Final Endotracheal Airway:  ETT  Cuffed: Yes    Technique Used for Successful ETT Placement:  Direct laryngoscopy    Insertion Site:  Oral  Blade Type:  Stacy  Laryngoscope Blade/Videolaryngoscope Blade Size:  3  ETT Size (mm):  6.5  Measured from:  Teeth  ETT to Teeth (cm):  22  Placement Verified by: auscultation and capnometry    Cormack-Lehane Classification:  Grade IIa - partial view of glottis  Number of Attempts at Approach:  1

## 2023-02-18 NOTE — ED TRIAGE NOTES
"Chief Complaint   Patient presents with    Flank Pain     L sided, started 2/17 in the PM.  Intense pain started around 2100 tonight.  10/10 Bloody urine, nausea.  Hx of kidney stones       Pt ambulated to triage. Pt A&Ox4, came in for above complaint. Pt restless and appears in pain in triage    Pt to lobby . Pt educated on alerting staff in changes to condition. Pt verbalized understanding. Protocol ordered.     BP (!) 181/107   Pulse 83   Temp 36.4 °C (97.5 °F) (Temporal)   Resp 16   Ht 1.778 m (5' 10\")   Wt 94.2 kg (207 lb 10.8 oz)   SpO2 97%   BMI 29.80 kg/m²     "

## 2023-02-18 NOTE — ED NOTES
Med Rec complete per Pt at bedside w/family present.   Allergies reviewed.  Home Pharmacy:  [facility] Moreno

## 2023-02-18 NOTE — H&P
Hospital Medicine History & Physical Note    Date of Service  2/18/2023    Primary Care Physician  Elijah Millan M.D.    Consultants  Urology (Dr. Ely)    Code Status  Full Code    Chief Complaint  Chief Complaint   Patient presents with    Flank Pain     L sided, started 2/17 in the PM.  Intense pain started around 2100 tonight.  10/10 Bloody urine, nausea.  Hx of kidney stones       History of Presenting Illness  Keyur Fernandez is a 67 y.o. male with history of multiple nephrolithiasis, GERD, allergic rhinitis who presented 2/18/2023 with evaluation for left-sided flank pain.  Patient reported having intermittent left groin and left flank discomfort for the past 2 days.  He endorsed intermittent hematuria.  Patient reported having recurrent nephrolithiasis, due to this, became concerned and came to ER for evaluation.  In ER, noted to have creatinine of 2.05 with CTAP noted left nephrolithiasis with 3.2 mm left UPJ stone with obstructive changes.  Urology was consulted by ERP, tentative plan for intervention.  Admitted to medicine service for further evaluation and treatment.    I discussed the plan of care with patient, family, and bedside RN.    Review of Systems  Review of Systems   Constitutional:  Negative for chills and fever.   HENT:  Negative for hearing loss and tinnitus.    Eyes:  Negative for blurred vision and double vision.   Respiratory:  Negative for cough and shortness of breath.    Cardiovascular:  Negative for chest pain.   Gastrointestinal:  Positive for nausea. Negative for abdominal pain, heartburn and vomiting.   Genitourinary:  Positive for flank pain, frequency, hematuria and urgency. Negative for dysuria.   Musculoskeletal:  Negative for joint pain and myalgias.   Skin:  Negative for itching and rash.   Neurological:  Positive for weakness. Negative for dizziness and headaches.   Endo/Heme/Allergies:  Negative for environmental allergies. Does not bruise/bleed easily.    Psychiatric/Behavioral:  Negative for depression and substance abuse.    All other systems reviewed and are negative.    Past Medical History   has no past medical history on file.    Surgical History   has a past surgical history that includes appendectomy (01/01/1977).     Family History  family history is not on file.   Family history reviewed with patient. There is no family history that is pertinent to the chief complaint.     Social History   reports that he has never smoked. He has never used smokeless tobacco. He reports that he does not drink alcohol and does not use drugs.    Allergies  Not on File    Medications  Prior to Admission Medications   Prescriptions Last Dose Informant Patient Reported? Taking?   Esomeprazole Magnesium (NEXIUM PO) 2/16/2023 at PM Patient Yes Yes   Sig: Take 1 Dose by mouth every day.   albuterol 108 (90 Base) MCG/ACT Aero Soln inhalation aerosol PRN at PRN Patient No No   Sig: Inhale 2 Puffs every 6 hours as needed for Shortness of Breath.   budesonide-formoterol (SYMBICORT) 80-4.5 MCG/ACT Aerosol PRN at PRN Patient No No   Sig: Inhale 2 Puffs 2 times a day as needed (asthma).   cephALEXin (KEFLEX) 500 MG Cap 2/17/2023 at AM Patient Yes No   Sig: Take 500 mg by mouth 2 times a day.   fexofenadine (ALLEGRA) 60 MG Tab 2/17/2023 at UNK Patient Yes Yes   Sig: Take 60 mg by mouth every day.   tadalafil (CIALIS) 5 MG tablet 2/16/2023 at PM Patient Yes No   Sig: Take 5 mg by mouth every day.      Facility-Administered Medications: None       Physical Exam  Temp:  [36.4 °C (97.5 °F)] 36.4 °C (97.5 °F)  Pulse:  [73-83] 75  Resp:  [14-16] 14  BP: (115-181)/() 115/59  SpO2:  [91 %-97 %] 93 %  Blood Pressure : 115/59   Temperature: 36.4 °C (97.5 °F)   Pulse: 75   Respiration: 14   Pulse Oximetry: 93 %       Physical Exam  Vitals and nursing note reviewed.   Constitutional:       General: He is not in acute distress.  HENT:      Head: Normocephalic and atraumatic.      Nose: Nose  normal.      Mouth/Throat:      Mouth: Mucous membranes are dry.      Pharynx: Oropharynx is clear.   Eyes:      General: No scleral icterus.     Extraocular Movements: Extraocular movements intact.   Cardiovascular:      Rate and Rhythm: Normal rate and regular rhythm.      Pulses: Normal pulses.      Heart sounds:     No friction rub.   Pulmonary:      Effort: No respiratory distress.      Breath sounds: No wheezing or rales.   Chest:      Chest wall: No tenderness.   Abdominal:      General: There is no distension.      Tenderness: There is no abdominal tenderness. There is no guarding or rebound.   Genitourinary:     Comments: +left CVA tenderness  Musculoskeletal:         General: No swelling or tenderness. Normal range of motion.      Cervical back: Neck supple. No tenderness.   Skin:     General: Skin is warm and dry.      Capillary Refill: Capillary refill takes less than 2 seconds.   Neurological:      General: No focal deficit present.      Mental Status: He is alert and oriented to person, place, and time.   Psychiatric:         Mood and Affect: Mood normal.       Laboratory:  Recent Labs     02/18/23 0039   WBC 15.2*   RBC 4.64*   HEMOGLOBIN 15.5   HEMATOCRIT 44.8   MCV 96.6   MCH 33.4*   MCHC 34.6   RDW 44.9   PLATELETCT 196   MPV 10.7     Recent Labs     02/18/23 0039   SODIUM 142   POTASSIUM 4.1   CHLORIDE 105   CO2 23   GLUCOSE 135*   BUN 21   CREATININE 2.05*   CALCIUM 9.1     Recent Labs     02/18/23 0039   ALTSGPT 25   ASTSGOT 20   ALKPHOSPHAT 86   TBILIRUBIN 0.5   LIPASE 33   GLUCOSE 135*         No results for input(s): NTPROBNP in the last 72 hours.      No results for input(s): TROPONINT in the last 72 hours.    Imaging:  CT-RENAL COLIC EVALUATION(A/P W/O)   Final Result         1.  Left nephrolithiasis with 3.2 mm left ureteropelvic junction stone is also thickening and urinary tract obstructive changes.   2.  Fluid tracking along the left ureter within the retroperitoneum, could represent  ureteral or calyceal rupture.   3.  Intermediate density right renal cystic lesion, could represent proteinaceous cyst otherwise indeterminate. Recommend follow-up 3 phase CT of the kidneys or MRI of the kidneys with contrast for further characterization   4.  Fat-containing small left inguinal hernia   5.  Fat-containing umbilical hernia   6.  Diverticulosis          no X-Ray or EKG requiring interpretation    Assessment/Plan:  Justification for Admission Status  I anticipate this patient will require least two midnights of hospitalization, therefore appropriate for inpatient status.        * Obstructive uropathy- (present on admission)  Assessment & Plan  CTAP: left nephrolithiasis with 3.2 mm left UPJ stone with obstructive changes  IVF  Supportive antiemetic and pain control  Trial of Flomax  Urology follow appreciated    Nephrolithiasis  Assessment & Plan  As noted above    LUDMILA (acute kidney injury) (HCC)  Assessment & Plan  Suspect secondary to above  IVF  F/u FeNa  Minimize nephrotoxic agents, renally dose meds    Leukocytosis  Assessment & Plan  Likely reactive  IVF  Follow-up UA, urine culture    Asthma  Assessment & Plan  Not in acute exacerbation  Continue home Symbicort, PRN albuterol    Allergic rhinitis  Assessment & Plan  Allegra    GERD (gastroesophageal reflux disease)  Assessment & Plan  PPI        VTE prophylaxis: SCDs/TEDs

## 2023-02-18 NOTE — CARE PLAN
The patient is Watcher - Medium risk of patient condition declining or worsening    Shift Goals  Clinical Goals: pain management    Progress made toward(s) clinical / shift goals:  Patient will verbalize pain score 5/10 during this shift. Patient fluid balance will be maintained during this shift.    Problem: Pain - Standard  Goal: Alleviation of pain or a reduction in pain to the patient’s comfort goal  Outcome: Progressing     Problem: Fluid Volume  Goal: Fluid volume balance will be maintained  Outcome: Progressing       Patient is not progressing towards the following goals:

## 2023-02-18 NOTE — PROGRESS NOTES
Pt is transported up to floor by [facility] transporter on 2 lpm on a full tank of O2. The pt is completely awake and in no acute distress prior to leaving the PACU. Pain is at a tolerable level and are not exhibiting any n/v.     Handoff report given to Denise VILLAFANA on the receiving unit and are aware of pt arrival.

## 2023-02-18 NOTE — OP REPORT
Urologic Surgery Operative Report     Pre-op Diagnosis: Left nephrolithiasis  Left ureterolithiasis  Left hydronephrosis  Left renal colick   Post-op Diagnosis: Same as above   Procedure: 1.  Cystoscopy, left ureteroscopy, left ureteral stent placement, aborted lithotripsy  2.  Dilation left ureter with dilator   Surgeon: Surgeon(s) and Role:     * Shekhar Alvarado M.D. - Primary   Assistant: Circulator: James Shaw R.N.; Bhakti Starr R.N.  Scrub Person: Cisco Lazar   Anesthesia: * No anesthesia type entered *, general  Anesthesiologist: Jabier Villasenor M.D.   Estimated Blood Loss: * No blood loss amount entered *   IV fluids <1L Crystalloid    Specimens: 1. Left Stone for chemical analysis    Complications: None   Condition: Stable, procedure well tolerated    Drains: 1. Left 9Ec26in, JJ stent (off strings)   2.  18 [place] two-way Lennon catheter   Disposition:  1. PACU, discharge after voiding  2. f/u approximately 2 weeks for left ureteroscopy with laser lithotripsy   Findings 1.  Patient had fairly narrow ureter throughout, and was initially difficult to get scope within the ureteral orifice itself, then through distal ureter, and then even at the proximal ureter.  I did dilate this area using a [place] dilator, however this would not pass all the way up to where the stone location proximal ureter was.  The scope would also not pass after dilation over the wire or alongside it.  The case was thus aborted and a left ureteral stent was placed with plans to come back in 2 weeks for definitive ureteroscopy lithotripsy  2.  He had no significant bladder neck contracture with intact membranous urethra status post prostatectomy with absent prostate.  Per patient's wishes noting a straining to urinate I did leave a Lennon catheter which he can remove the next 1 to 2 days.  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  After a full discussion of alternatives, risks, and benefits the patient consented to proceeding with Ureteroscopy, laser lithotripsy and possible JJ stent placement on the respective side.  He understands the risk of inability to access ureter, the need for second procedures, the possibility of negative ureteroscopy, that he may have stent discomfort until this is removed, bleeding, infection, ureteral injury or stricture, bladder injury, post op urinary retention requiring garrido catheter, and the general pulmonary and cardiovascular risks associated with anesthesia.     Procedure Details:   The patient was taken to operating room and placed on table in supine position.  Ancef was administered prior to the start of the procedure based on previous urine cultures. Sequential compression devices were placed for deep venous thrombosis prophylaxis. After induction of general anesthesia, both legs were placed in Terrance stirrups in the standard lithotomy position.  A timeout was held confirming the correct patient, procedure and laterality.   The perineal area was prepped and draped in a sterile fashion. A 21 Tamazight rigid cystoscope was inserted into the urethra and the bladder was emptied and then distended with sterile saline. Urethral sounds were not needed to dilate the urethral meatus. Cystoscopy revealed normal bladder mucosa, orthotopic ureteral orifices, and absent prostate as described above without bladder contracture.    We passed a wire through the ureteral orifice of the respective side into the renal pelvis which was visualized under fluoroscopic vision.  The distal ureter orifice was dilated using a [place] dilator as it was quite narrow. The wire was moved to the side and a semirigid ureteroscope was placed into the urethra and passed up the left ureter. We did  need a ureteral dilator to pass the scope into the ureter.   This was passed into about the mid to distal ureter where no longer distended easily.  I then tried to pass the ureteroscope up the ureter alongside the wire, this would only send to the mid ureter and not to level the stone.  I then tried to send it over the wire after redilated with a Stacy dilator again once this time did get up to about the proximal ureter.  However despite all these efforts the scope did not ascend into the kidney but the stone was either likely pushed up there or in the very proximal ureter.  At this point I did for the procedure given the narrowing of his ureter and desire to not cause any ureteral injury or long-term damage. Returnign to rigid cystoscope, we then placed a Left-sided JJ stent, [id number], JJ stent (off strings)  under fluoroscopy. We then saw that the JJ stent was in appropriate position, with a good curl visualized in the renal pelvis fluoroscopically and a good curl in the bladder endoscopically. The cystoscope was removed after emptying the bladder.  The patient was awoken from anesthesia and brought to recovery in satisfactory condition.        Shekhar Alvarado M.D.   [address] [place] [address]   [phone]

## 2023-02-18 NOTE — DISCHARGE SUMMARY
Discharge Summary    CHIEF COMPLAINT ON ADMISSION  Chief Complaint   Patient presents with    Flank Pain     L sided, started 2/17 in the PM.  Intense pain started around 2100 tonight.  10/10 Bloody urine, nausea.  Hx of kidney stones       Reason for Admission  Flank Pain;Urinary Pain; Nausea;      Admission Date  2/18/2023    CODE STATUS  Full Code    HPI & HOSPITAL COURSE  Keyur Fernandez is a 67 y.o. male with history of multiple nephrolithiasis, GERD, allergic rhinitis who presented 2/18/2023 with evaluation for left-sided flank pain.  Patient reported having intermittent left groin and left flank discomfort for the past 2 days.  He endorsed intermittent hematuria.  Patient reported having recurrent nephrolithiasis, due to this, became concerned and came to ER for evaluation.  In ER, noted to have creatinine of 2.05 with CTAP noted left nephrolithiasis with 3.2 mm left UPJ stone with obstructive changes.  Urology was consulted by ERP, tentative plan for intervention.  Admitted to medicine service for further evaluation and treatment.    Seen by urology, ureteroscopy performed and left ureteral stent placement and ureter dilation performed.Lithotripsy unable to be performed, stone not visualized. Catheter will remain in place and patient will self remove on Monday. Follow up with [facility] [place].    Therefore, he is discharged in good and stable condition to home with close outpatient follow-up.    The patient recovered much more quickly than anticipated on admission.    Discharge Date  2/18/23    FOLLOW UP ITEMS POST DISCHARGE  Obstructive uropathy    DISCHARGE DIAGNOSES  Principal Problem:    Obstructive uropathy POA: Yes  Active Problems:    LUDMILA (acute kidney injury) (HCC) POA: Unknown    Nephrolithiasis POA: Unknown    GERD (gastroesophageal reflux disease) POA: Unknown    Allergic rhinitis POA: Unknown    Leukocytosis POA: Unknown    Asthma POA: Unknown  Resolved Problems:    * No resolved hospital problems.  *      FOLLOW UP  No future appointments.  Shekhar Alvarado M.D.  [address]  [place] [address]  [phone]    Schedule an appointment as soon as possible for a visit        MEDICATIONS ON DISCHARGE     Medication List        START taking these medications        Instructions   oxybutynin SR 10 MG CR tablet  Commonly known as: DITROPAN-XL   Take 1 Tablet by mouth 1 time a day as needed (bladder spasms/stent pain.). For stent pain/bladder spasms. Stop if having difficulty peeing.  Dose: 10 mg     oxyCODONE immediate-release 5 MG Tabs  Commonly known as: ROXICODONE   Take 1-2 Tablets by mouth every 6 hours as needed for Severe Pain (moderate to severe pain refractory to tylenol or NSAIDs.) for up to 5 days.  Dose: 5-10 mg     tamsulosin 0.4 MG capsule  Commonly known as: FLOMAX   Take 1 Capsule by mouth every day. For relief from stent or ureteral pain.  Dose: 0.4 mg            CONTINUE taking these medications        Instructions   albuterol 108 (90 Base) MCG/ACT Aers inhalation aerosol   Inhale 2 Puffs every 6 hours as needed for Shortness of Breath.  Dose: 2 Puff     budesonide-formoterol 80-4.5 MCG/ACT Aero  Commonly known as: Symbicort   Inhale 2 Puffs 2 times a day as needed (asthma).  Dose: 2 Puff     cephALEXin 500 MG Caps  Commonly known as: KEFLEX   Take 500 mg by mouth 2 times a day.  Dose: 500 mg     docusate sodium 100 MG Caps  Commonly known as: COLACE   Doctor's comments: Please use to prevent constipation.  If having loose stools, this can be held.  Take 1 Capsule by mouth 2 times a day.  Dose: 100 mg     fexofenadine 60 MG Tabs  Commonly known as: ALLEGRA   Take 60 mg by mouth every day.  Dose: 60 mg     NEXIUM PO   Take 1 Dose by mouth every day.  Dose: 1 Dose     phenazopyridine 100 MG Tabs  Commonly known as: PYRIDIUM   Take 1 Tablet by mouth 3 times a day as needed (bladder pain, dysuria).  Dose: 100 mg     polyethylene glycol/lytes 17 g Pack  Commonly known as: MIRALAX   Take 1 Packet by mouth  every day. Please take to prevent constipation following your procedure.  Hold if loose stools  Dose: 17 g     tadalafil 5 MG tablet  Commonly known as: Cialis   Take 5 mg by mouth every day.  Dose: 5 mg              Allergies  Not on File    DIET  Orders Placed This Encounter   Procedures    Diet Order Diet: Regular     Standing Status:   Standing     Number of Occurrences:   1     Order Specific Question:   Diet:     Answer:   Regular [1]       ACTIVITY  As tolerated.  Weight bearing as tolerated    CONSULTATIONS  Urology-Dr Alvarado    PROCEDURES  2/18/23: Cystoscopy, left uteroscopy and left ureteral stent placement, left ureter dilation    LABORATORY  Lab Results   Component Value Date    SODIUM 142 02/18/2023    SODIUM 141 02/18/2023    POTASSIUM 4.1 02/18/2023    POTASSIUM 4.1 02/18/2023    CHLORIDE 105 02/18/2023    CHLORIDE 106 02/18/2023    CO2 23 02/18/2023    CO2 19 (L) 02/18/2023    GLUCOSE 135 (H) 02/18/2023    GLUCOSE 130 (H) 02/18/2023    BUN 21 02/18/2023    BUN 22 02/18/2023    CREATININE 2.05 (H) 02/18/2023    CREATININE 1.80 (H) 02/18/2023        Lab Results   Component Value Date    WBC 15.2 (H) 02/18/2023    HEMOGLOBIN 15.5 02/18/2023    HEMATOCRIT 44.8 02/18/2023    PLATELETCT 196 02/18/2023        Total time of the discharge process exceeds 36 minutes.

## 2023-02-18 NOTE — ANESTHESIA POSTPROCEDURE EVALUATION
Patient: Keyur Fernandez    Procedure Summary     Date: 02/18/23 Room / Location: [place] [address] / SURGERY [facility]    Anesthesia Start: 1158 Anesthesia Stop: 1253    Procedure: CYSTOSCOPY, WITH URETERAL STENT INSERTION (Left) Diagnosis:     Surgeons: Shekhar Alvarado M.D. Responsible Provider: Jabier Villasenor M.D.    Anesthesia Type: general ASA Status: 2          Final Anesthesia Type: general  Last vitals  BP   Blood Pressure : 128/78    Temp   36.5 °C (97.7 °F)    Pulse   78   Resp   16    SpO2   93 %      Anesthesia Post Evaluation    Patient location during evaluation: PACU  Patient participation: complete - patient participated  Level of consciousness: awake and alert    Airway patency: patent  Anesthetic complications: yes (Bronchospasm on emergence prior to tube removal.  Desaturation to the 80s treated with propofol and albuterol)  Cardiovascular status: hemodynamically stable  Respiratory status: acceptable  Hydration status: euvolemic    PONV: none          No notable events documented.     Nurse Pain Score: 7 (NPRS)

## 2023-02-18 NOTE — DISCHARGE INSTRUCTIONS
DR. SALCEDO' DISCHARGE INSTRUCTIONS FOLLOWING   URETEROSCOPY AND ABORTED LASER LITHOTRIPSY  WITH STENT     DIET:  You can resume your regular diet. We encourage you to eat well-balanced and nutritious meals.      ACTIVITY:  Please restrain from strenuous activity or heavy lifting (more than 20 pounds) for the next week.  Please walk daily as much as tolerated, making exercise a part of your daily life. Do not drive while using narcotics for pain control. You may resumes showering and bathing without any restrictions.     WOUND CARE:  1. You have no dressing or open wounds to manage.   2. You have an internal ureteral stent OFF strings. We will need to leave this in place until your follow up procedure (2nd look ureteroscopy) in 2-4 weeks.  The stent will likely be removed at that visit, but possible replaced for short period    CATHETER CARE:  1. You have a garrido catheter in place, and you should care for it as instructed by nurse.  This should be removed on Monday AM using a syringe to deflate the catheter balloon as instructed by the RN in the PACU.     MEDICATIONS:  - Please use an over the counter antiinflammatory (ie Ibuprofen, naproxen, Ketoralac) and/or Tylenol for pain control as needed.  - You may take 3 days of pyridium as prescribed for numbing the bladder and burning with urination.  - You have been prescribed oxybutynin prn to help with bladder spasms or burning with urination. Please hold this if having difficulty urinating however.   - If you have severe pain refractory to Ibuprofen you may use Oxycodone for pain control as well as prescribed. If taking a narcotic, please use a stool softener like miralax or colace to prevent constipation.  - Please use flomax daily as prescribed while you have a stent in place to lessen stone pain.   - If you are using aspirin, Plavix, or coumadin, please don't restart these medications until 1 day after your discharge if you are not having large amounts of blood in  "the urine.    FOLLOW-UP:  We will call you to schedule your follow up \"2nd look ureteroscopy\" to finish treating the rest of stone burden in ~2-3 weeks with Dr. Alvarado. If you have not heard from us in 3-4 business days, please call [phone] to schedule your follow-up appointment. You may also contact this number if you have questions or concerns that can be answered by Dr. Alvarado' staff.      WARNING SIGNS:  Fever greater than 101 degrees Fahrenheit, chills, nausea or vomiting, Large amount of clots in urine that make it difficult to urinate or for urine to drain from garrido, increasing pain, or abdominal swelling. If you are experiencing these symptoms, call the Urology Clinic or go to your local PCP or emergency room.    It is normal to see blood in your urine for up to 2 weeks even from surgery. The urine may clear up entirely, and then turn bloody again a few days later depending on your activity level; do not be alarmed. However, if you experience severe pain or tenderness, have a lot of increased bleeding, or find that you are unable to urinate because of large clots, please notify your doctor immediately     MEDICAL HELP DURING NORMAL BUSINESS HOURS:  Between the hours of 8 AM and 5 PM, please call [phone] to speak with Dr. Shekhar Alvarado’ staff.     MEDICAL HELP AFTER HOURS:  If you have a serious emergency such as chest pain, shortness of breath, relentless pain you should call 911. For other urgent problems after hours you may contact the urology physician on call by phoning the [phone]. You may also visit the Emergency room at local hospital for help.     For non-emergent problems such as prescription refills or routine questions, please do your best to contact us during normal business hours. This after-hours number should be used for urgent or emergent questions only.         Shekhar Alvarado M.D.   [address] [facility] [place] [address]   [phone]             "

## 2023-02-18 NOTE — ED PROVIDER NOTES
ED Provider Note    CHIEF COMPLAINT  Chief Complaint   Patient presents with    Flank Pain     L sided, started 2/17 in the PM.  Intense pain started around 2100 tonight.  10/10 Bloody urine, nausea.  Hx of kidney stones       EXTERNAL RECORDS REVIEWED  Outpatient Notes patient seen via telemedicine on 17 January due to concern for coronavirus screening.    HPI/ROS  LIMITATION TO HISTORY   Select: : None  OUTSIDE HISTORIAN(S):  Pardeepon at bedside    Keyur Fernandez is a 67 y.o. male who presents with acute onset left flank pain.  He has had left groin and flank discomfort for the past 2 days.  Patient states pain acutely worsened at at 9 PM tonight, sudden in onset and severe 10 out of 10 pain.  It radiates throughout the left flank and back. He did note hematuria the night prior to arrival.  He has no dysuria or urgency.  He has had nausea but no vomiting.  He has a history of nephrolithiasis and states that this feels similar.  He has had a stent placed in the past.    Patient has a prior history of recurrent nephrolithiasis with stent placement.   He moved from [place] about 3 years prior.  He is a retired pathologist.    PAST MEDICAL HISTORY   Kidney stones    SURGICAL HISTORY   has a past surgical history that includes appendectomy (01/01/1977).    FAMILY HISTORY  History reviewed. No pertinent family history.    SOCIAL HISTORY  Social History     Tobacco Use    Smoking status: Never    Smokeless tobacco: Never   Vaping Use    Vaping Use: Never used   Substance and Sexual Activity    Alcohol use: Never    Drug use: Never    Sexual activity: Not on file       CURRENT MEDICATIONS  Home Medications       Reviewed by Adama Pino (Pharmacy Tech) on 02/18/23 at 0451  Med List Status: Complete     Medication Last Dose Status   albuterol 108 (90 Base) MCG/ACT Aero Soln inhalation aerosol PRN Active   budesonide-formoterol (SYMBICORT) 80-4.5 MCG/ACT Aerosol PRN Active   cephALEXin (KEFLEX) 500 MG Cap  "2/17/2023 Active   Esomeprazole Magnesium (NEXIUM PO) 2/16/2023 Active   fexofenadine (ALLEGRA) 60 MG Tab 2/17/2023 Active   tadalafil (CIALIS) 5 MG tablet 2/16/2023 Active                    ALLERGIES  Not on File    PHYSICAL EXAM  VITAL SIGNS: BP 97/56   Pulse 72   Temp 36.6 °C (97.8 °F)   Resp 17   Ht 1.778 m (5' 10\")   Wt 95 kg (209 lb 7 oz)   SpO2 94%   BMI 30.05 kg/m²    Constitutional: Awake and alert . Uncomfortable appearing   HENT: Normal inspection  Dry mucous membranes  Eyes: Normal inspection  Neck: Grossly normal range of motion.  Cardiovascular: Normal heart rate, Normal rhythm.   Thorax & Lungs: No respiratory distress, Clear lungs  Abdomen: Soft, non-distended, nontender to palpation in all 4 quadrants, no mass  Back: No CVA tenderness  Skin: No obvious rash.  Extremities: Warm, well perfused.  Neurologic: Grossly normal   Psychiatric: Normal for situation      DIAGNOSTIC STUDIES / PROCEDURES    LABS  Results for orders placed or performed during the hospital encounter of 02/18/23   CBC WITH DIFFERENTIAL   Result Value Ref Range    WBC 15.2 (H) 4.8 - 10.8 K/uL    RBC 4.64 (L) 4.70 - 6.10 M/uL    Hemoglobin 15.5 14.0 - 18.0 g/dL    Hematocrit 44.8 42.0 - 52.0 %    MCV 96.6 81.4 - 97.8 fL    MCH 33.4 (H) 27.0 - 33.0 pg    MCHC 34.6 33.7 - 35.3 g/dL    RDW 44.9 35.9 - 50.0 fL    Platelet Count 196 164 - 446 K/uL    MPV 10.7 9.0 - 12.9 fL    Neutrophils-Polys 86.80 (H) 44.00 - 72.00 %    Lymphocytes 7.40 (L) 22.00 - 41.00 %    Monocytes 4.70 0.00 - 13.40 %    Eosinophils 0.30 0.00 - 6.90 %    Basophils 0.30 0.00 - 1.80 %    Immature Granulocytes 0.50 0.00 - 0.90 %    Nucleated RBC 0.00 /100 WBC    Neutrophils (Absolute) 13.19 (H) 1.82 - 7.42 K/uL    Lymphs (Absolute) 1.13 1.00 - 4.80 K/uL    Monos (Absolute) 0.71 0.00 - 0.85 K/uL    Eos (Absolute) 0.05 0.00 - 0.51 K/uL    Baso (Absolute) 0.04 0.00 - 0.12 K/uL    Immature Granulocytes (abs) 0.08 0.00 - 0.11 K/uL    NRBC (Absolute) 0.00 K/uL   COMP " METABOLIC PANEL   Result Value Ref Range    Sodium 142 135 - 145 mmol/L    Potassium 4.1 3.6 - 5.5 mmol/L    Chloride 105 96 - 112 mmol/L    Co2 23 20 - 33 mmol/L    Anion Gap 14.0 7.0 - 16.0    Glucose 135 (H) 65 - 99 mg/dL    Bun 21 8 - 22 mg/dL    Creatinine 2.05 (H) 0.50 - 1.40 mg/dL    Calcium 9.1 8.5 - 10.5 mg/dL    AST(SGOT) 20 12 - 45 U/L    ALT(SGPT) 25 2 - 50 U/L    Alkaline Phosphatase 86 30 - 99 U/L    Total Bilirubin 0.5 0.1 - 1.5 mg/dL    Albumin 4.3 3.2 - 4.9 g/dL    Total Protein 6.6 6.0 - 8.2 g/dL    Globulin 2.3 1.9 - 3.5 g/dL    A-G Ratio 1.9 g/dL   LIPASE   Result Value Ref Range    Lipase 33 11 - 82 U/L   CORRECTED CALCIUM   Result Value Ref Range    Correct Calcium 8.9 8.5 - 10.5 mg/dL   ESTIMATED GFR   Result Value Ref Range    GFR (CKD-EPI) 35 (A) >60 mL/min/1.73 m 2   CRP QUANTITIVE (NON-CARDIAC)   Result Value Ref Range    Stat C-Reactive Protein 0.51 0.00 - 0.75 mg/dL   MAGNESIUM   Result Value Ref Range    Magnesium 2.0 1.5 - 2.5 mg/dL   PROCALCITONIN   Result Value Ref Range    Procalcitonin 0.05 <0.25 ng/mL   Renal Function Panel   Result Value Ref Range    Sodium 141 135 - 145 mmol/L    Potassium 4.1 3.6 - 5.5 mmol/L    Chloride 106 96 - 112 mmol/L    Co2 19 (L) 20 - 33 mmol/L    Glucose 130 (H) 65 - 99 mg/dL    Creatinine 1.80 (H) 0.50 - 1.40 mg/dL    Bun 22 8 - 22 mg/dL    Calcium 9.2 8.5 - 10.5 mg/dL    Phosphorus 2.8 2.5 - 4.5 mg/dL    Albumin 4.5 3.2 - 4.9 g/dL   CORRECTED CALCIUM   Result Value Ref Range    Correct Calcium 8.8 8.5 - 10.5 mg/dL   ESTIMATED GFR   Result Value Ref Range    GFR (CKD-EPI) 41 (A) >60 mL/min/1.73 m 2         RADIOLOGY  I have independently interpreted the diagnostic imaging associated with this visit and am waiting the final reading from the radiologist.   My preliminary interpretation is a follows: Left UPJ stone  Radiologist interpretation: See chart for details    COURSE & MEDICAL DECISION MAKING    ED Observation Status? Yes; I am placing the  patient in to an observation status due to a diagnostic uncertainty as well as therapeutic intensity. Patient placed in observation status at 2:03 AM, 2/18/2023.     Observation plan is as follows: IV, Labs, Pain Medication, Reevaluation    Upon Reevaluation, the patient's condition has: not improved; and will be escalated to hospitalization.    Patient discharged from ED Observation status at 4.44 2/18    INITIAL ASSESSMENT, COURSE AND PLAN  Care Narrative: Patient presents with flank pain consistent with previous kidney stone pain. Patient hypertensive, otherwise with normal vitals and is uncomfortable but systemically well appearing.    CT renal colic protocol with  hydronephrosis on affected side and 3.2mm left UPJ stone. Labs notable for LUDMILA with Cr 2 and a leukocytosis.  The radiologist also noted fluid tracking along the left ureter within the retroperitoneum, could represent ureteral or calyceal rupture.    4.05AM  I discussed this patient with Dr. Ely with Urology.  I also explained the imaging findings and my concern for ureteral or calyceal ruptures.  He said that this itself require urgent surgical intervention.  He said it likely does explain his significant LUDMILA.    On reevaluation, the patient's pain had significantly improved with administration of IV Dilaudid, fluids, Zofran and Toradol in the emergency department.  After discussion with both Dr. Ely and the patient, ultimately decision made for the patient to be admitted to the hospital.  He will remain n.p.o. for possible stent placement.    He was unable to provide a urine sample, additional fluids ordered.  He does not appear to be septic at this time.  Dr. Hampton admitted for ongoing management.     HYDRATION: Based on the patient's presentation of Dehydration the patient was given IV fluids. IV Hydration was used because oral hydration was not adequate alone. Upon recheck following hydration, the patient was improved.        DISPOSITION  AND DISCUSSIONS  I have discussed management of the patient with the following physicians and PRAKASH's:  Dr. Ely. Dr. Hampton    Discussion of management with other [facility] or appropriate source(s): None       FINAL DIAGNOSIS  1. LUDMILA (acute kidney injury) (HCC) Acute   2. Ureterolithiasis Acute   3. Left flank pain Acute          Electronically signed by: Monica Garcia M.D., 2/18/2023 2:01 AM

## 2023-02-18 NOTE — OR NURSING
Pt is aaox4, resting comfortably in hospital bed on 2lpm nasal cannula. His respirations are equal and unlabored, he is in no acute distress. He does not complain of pain at this time. He takes sips of water without difficulty or complaints of n/v. Garrido catheter in place draining red urine, no clots noted in garrido collection bag. Dr. Alvarado at pt's bedside to talk about procedure with all questions answered. Will continue to monitor.

## 2023-02-18 NOTE — CONSULTS
[facility] [place] Consult/H&P Note    Patient's Name/MRN: Keyur Fernandez, [id number]   Room #: TPERIPOOL/NONE    Admit Date:2/18/2023  Today's Date: 2/18/2023   Length of stay:  LOS: 0 days      Reason for consult/chief complaint: left ureteral stone  ID/HPI: Keyur Fernandez is a 67 y.o. male patient who p/w severe 8/10 flank pain on left side. He has had +N, +V, -F, -C.  This is his 4 episode of stones. Has 4 had prior stone procedures in past.  In ER, WBC was normal, UA was not c/w infection, and Cr was 1.8 up from baseline.  CT was doen showing 3mm stone in the left ureter.  He does have history of RALP in 2015 for prostate cancer. PSA undetectable since then.      Past Medical History:   History reviewed. No pertinent past medical history.     Past Surgical History:   Past Surgical History:   Procedure Laterality Date    APPENDECTOMY  01/01/1977        Family History:   History reviewed. No pertinent family history.      Social History:   Social History     Tobacco Use    Smoking status: Never    Smokeless tobacco: Never   Vaping Use    Vaping Use: Never used   Substance Use Topics    Alcohol use: Never    Drug use: Never      Social History     Social History Narrative    Not on file        Allergies: he Patient has no allergy information on record.    Medications:   Medications Prior to Admission   Medication Sig Dispense Refill Last Dose    Esomeprazole Magnesium (NEXIUM PO) Take 1 Dose by mouth every day.   2/16/2023 at PM    fexofenadine (ALLEGRA) 60 MG Tab Take 60 mg by mouth every day.   2/17/2023 at UNK    cephALEXin (KEFLEX) 500 MG Cap Take 500 mg by mouth 2 times a day.   2/17/2023 at AM    tadalafil (CIALIS) 5 MG tablet Take 5 mg by mouth every day.   2/16/2023 at PM    albuterol 108 (90 Base) MCG/ACT Aero Soln inhalation aerosol Inhale 2 Puffs every 6 hours as needed for Shortness of Breath. 8.5 g 1 PRN at PRN    budesonide-formoterol (SYMBICORT) 80-4.5 MCG/ACT Aerosol Inhale 2 Puffs 2 times a day as needed  "(asthma). 10.2 g 0 PRN at PRN         Review of Systems  Review of Systems   Constitutional:  Negative for chills and fever.   Eyes:  Negative for blurred vision.   Respiratory:  Negative for shortness of breath.    Cardiovascular:  Negative for chest pain.   Gastrointestinal:  Positive for abdominal pain, nausea and vomiting.   Genitourinary:  Positive for dysuria, flank pain, frequency, hematuria and urgency.   Musculoskeletal:  Positive for back pain.   Skin:  Negative for rash.   Endo/Heme/Allergies:  Does not bruise/bleed easily.   Psychiatric/Behavioral:  Negative for depression.       Physical Exam  VITAL SIGNS: BP 97/56   Pulse 72   Temp 36.6 °C (97.8 °F) (Temporal)   Resp 17   Ht 1.778 m (5' 10\")   Wt 95 kg (209 lb 7 oz)   SpO2 94%   BMI 30.05 kg/m²   GENERAL:  alert, in no acute distress  EYES: EOMI and normal accomodation  Neck: Supple  BACK: No CVA tenderness.   CHEST AND LUNGS: good air entry, no audible wheezes  CARDIOVASCULAR: Rate is regular, no peripheral edema.   ABDOMEN: Abdomen soft, nontender. No masses, organomegaly.  : deffered  EXTREMITIES: Warm and well perfused without c/c/e  NEURO: No focal deficits  SKIN: Skin color, texture, turgor normal. No rashes, lesions, nor jaundice.      Labs:  Recent Labs     02/18/23 0039   WBC 15.2*   RBC 4.64*   HEMOGLOBIN 15.5   HEMATOCRIT 44.8   MCV 96.6   MCH 33.4*   MCHC 34.6   RDW 44.9   PLATELETCT 196   MPV 10.7     Recent Labs     02/18/23 0039   SODIUM 141  142   POTASSIUM 4.1  4.1   CHLORIDE 106  105   CO2 19*  23   GLUCOSE 130*  135*   BUN 22  21   CREATININE 1.80*  2.05*   CALCIUM 9.2  9.1         Glucose:  Lab Results   Component Value Date/Time    GLUCOSE 135 (H) 02/18/2023 12:39 AM    GLUCOSE 130 (H) 02/18/2023 12:39 AM     Coags:  No results found for: INR      Urinalysis:   Lab Results   Component Value Date/Time    COLORURINE Yellow 02/18/2023 09:40 AM    CLARITY Clear 02/18/2023 09:40 AM    SPECGRAVITY 1.030 02/18/2023 " 09:40 AM    PHURINE 5.5 02/18/2023 09:40 AM    GLUCOSEUR Negative 02/18/2023 09:40 AM    KETONES 15 (A) 02/18/2023 09:40 AM    NITRITE Negative 02/18/2023 09:40 AM    OCCULTBLOOD Moderate (A) 02/18/2023 09:40 AM    RBCURINE 5-10 (A) 02/18/2023 09:40 AM    BACTERIA Negative 02/18/2023 09:40 AM    EPITHELCELL Negative 02/18/2023 09:40 AM       Imaging:                                                     Assessment/Recommendation   67 y.o.M with left 3mm ureteral stone.  He understands the risk of inability to access ureter, the need for second procedures, the possibility of negative ureteroscopy, that he may have stent discomfort until this is removed, bleeding, infection, ureteral injury or stricture, bladder injury, post op urinary retention requiring garrido catheter, and the general pulmonary and cardiovascular risks associated with anesthesia.  After a full discussion of the alternatives risks and benefits of the procedure, the patient consented to proceeding with the planned procedure.  WE did speicfically discuss ptoential risk/need for BNC dilation if he had one given his history of  RALP in 2015.    Consent obatined  Add on for Cystoscopy, left ureteroscopy, Laser lithotripsy and JJ stents (CULTS)       Shekhar Alvarado M.D.   [address] [address]   [phone]

## 2023-02-18 NOTE — ANESTHESIA PREPROCEDURE EVALUATION
Case: [id number] Date/Time: 02/18/23 1235    Procedures:       CYSTOSCOPY, WITH URETERAL STENT INSERTION OR REMOVAL (Left)      URETEROSCOPY      LITHOTRIPSY, USING LASER    [facility]    Surgeons: Shekhar Alvarado M.D.        66 yo M w/ very mild asthma and nephrolithiasis  Relevant Problems   PULMONARY   (positive) Asthma      GI   (positive) GERD (gastroesophageal reflux disease)         (positive) LUDMILA (acute kidney injury) (HCC)   (positive) Nephrolithiasis       Physical Exam    Airway   Mallampati: III  TM distance: >3 FB  Neck ROM: full       Cardiovascular - normal exam  Rhythm: regular  Rate: normal  (-) murmur     Dental - normal exam             Pulmonary - normal exam  Breath sounds clear to auscultation     Abdominal    Neurological - normal exam                 Anesthesia Plan    ASA 2       Plan - general       Airway plan will be ETT          Induction: intravenous    Postoperative Plan: Postoperative administration of opioids is intended.    Pertinent diagnostic labs and testing reviewed    Informed Consent:    Anesthetic plan and risks discussed with patient.    Use of blood products discussed with: patient whom consented to blood products.

## 2023-02-18 NOTE — PROGRESS NOTES
4 Eyes Skin Assessment Completed by BRODERICK Camargo and BRODERICK Aguilar.    Head WDL  Ears WDL  Nose WDL  Mouth WDL  Neck WDL  Breast/Chest WDL  Shoulder Blades WDL  Spine WDL  (R) Arm/Elbow/Hand WDL  (L) Arm/Elbow/Hand WDL  Abdomen WDL  Groin WDL  Scrotum/Coccyx/Buttocks WDL  (R) Leg WDL  (L) Leg WDL  (R) Heel/Foot/Toe WDL  (L) Heel/Foot/Toe WDL          Devices In Places IABP      Interventions In Place N/A    Possible Skin Injury No    Pictures Uploaded Into Epic N/A  Wound Consult Placed N/A  RN Wound Prevention Protocol Ordered No

## 2023-02-19 NOTE — PROGRESS NOTES
Went over discharge instructions with patient. Patient to remove garrido Monday morning 2/20/2023. Patient already knows how to do it. Went over garrido care.

## 2023-02-20 LAB
BACTERIA UR CULT: NORMAL
SIGNIFICANT IND 70042: NORMAL
SITE SITE: NORMAL
SOURCE SOURCE: NORMAL

## 2023-03-01 ENCOUNTER — PRE-ADMISSION TESTING (OUTPATIENT)
Dept: ADMISSIONS | Facility: MEDICAL CENTER | Age: 68
End: 2023-03-01
Attending: UROLOGY
Payer: MEDICARE

## 2023-03-07 ENCOUNTER — ANESTHESIA EVENT (OUTPATIENT)
Dept: SURGERY | Facility: MEDICAL CENTER | Age: 68
End: 2023-03-07
Payer: MEDICARE

## 2023-03-07 ENCOUNTER — APPOINTMENT (OUTPATIENT)
Dept: RADIOLOGY | Facility: MEDICAL CENTER | Age: 68
End: 2023-03-07
Attending: UROLOGY
Payer: MEDICARE

## 2023-03-07 ENCOUNTER — ANESTHESIA (OUTPATIENT)
Dept: SURGERY | Facility: MEDICAL CENTER | Age: 68
End: 2023-03-07
Payer: MEDICARE

## 2023-03-07 ENCOUNTER — HOSPITAL ENCOUNTER (OUTPATIENT)
Facility: MEDICAL CENTER | Age: 68
End: 2023-03-07
Attending: UROLOGY | Admitting: UROLOGY
Payer: MEDICARE

## 2023-03-07 VITALS
TEMPERATURE: 97.5 F | SYSTOLIC BLOOD PRESSURE: 152 MMHG | RESPIRATION RATE: 16 BRPM | WEIGHT: 204.15 LBS | HEIGHT: 70 IN | DIASTOLIC BLOOD PRESSURE: 82 MMHG | BODY MASS INDEX: 29.23 KG/M2 | OXYGEN SATURATION: 94 % | HEART RATE: 74 BPM

## 2023-03-07 DIAGNOSIS — N13.9 OBSTRUCTIVE UROPATHY: ICD-10-CM

## 2023-03-07 DIAGNOSIS — N20.0 NEPHROLITHIASIS: Primary | ICD-10-CM

## 2023-03-07 LAB
EKG IMPRESSION: NORMAL
INR PPP: 1.02 (ref 0.87–1.13)
PROTHROMBIN TIME: 13.3 SEC (ref 12–14.6)

## 2023-03-07 PROCEDURE — 700101 HCHG RX REV CODE 250: Performed by: ANESTHESIOLOGY

## 2023-03-07 PROCEDURE — C1769 GUIDE WIRE: HCPCS | Performed by: UROLOGY

## 2023-03-07 PROCEDURE — 700105 HCHG RX REV CODE 258: Performed by: UROLOGY

## 2023-03-07 PROCEDURE — 160047 HCHG PACU  - EA ADDL 30 MINS PHASE II: Performed by: UROLOGY

## 2023-03-07 PROCEDURE — 700111 HCHG RX REV CODE 636 W/ 250 OVERRIDE (IP): Performed by: ANESTHESIOLOGY

## 2023-03-07 PROCEDURE — 85610 PROTHROMBIN TIME: CPT

## 2023-03-07 PROCEDURE — 160025 RECOVERY II MINUTES (STATS): Performed by: UROLOGY

## 2023-03-07 PROCEDURE — 93005 ELECTROCARDIOGRAM TRACING: CPT | Performed by: UROLOGY

## 2023-03-07 PROCEDURE — 700111 HCHG RX REV CODE 636 W/ 250 OVERRIDE (IP): Performed by: UROLOGY

## 2023-03-07 PROCEDURE — 160009 HCHG ANES TIME/MIN: Performed by: UROLOGY

## 2023-03-07 PROCEDURE — 93010 ELECTROCARDIOGRAM REPORT: CPT | Performed by: INTERNAL MEDICINE

## 2023-03-07 PROCEDURE — C1747 HCHG SHELL REV 278 C1747: HCPCS | Performed by: UROLOGY

## 2023-03-07 PROCEDURE — 160036 HCHG PACU - EA ADDL 30 MINS PHASE I: Performed by: UROLOGY

## 2023-03-07 PROCEDURE — 160046 HCHG PACU - 1ST 60 MINS PHASE II: Performed by: UROLOGY

## 2023-03-07 PROCEDURE — 110371 HCHG SHELL REV 272: Performed by: UROLOGY

## 2023-03-07 PROCEDURE — C1894 INTRO/SHEATH, NON-LASER: HCPCS | Performed by: UROLOGY

## 2023-03-07 PROCEDURE — 160002 HCHG RECOVERY MINUTES (STAT): Performed by: UROLOGY

## 2023-03-07 PROCEDURE — 00918 ANES TRURL PX URTRL CAL RMVL: CPT | Performed by: ANESTHESIOLOGY

## 2023-03-07 PROCEDURE — 160028 HCHG SURGERY MINUTES - 1ST 30 MINS LEVEL 3: Performed by: UROLOGY

## 2023-03-07 PROCEDURE — 160035 HCHG PACU - 1ST 60 MINS PHASE I: Performed by: UROLOGY

## 2023-03-07 PROCEDURE — 160048 HCHG OR STATISTICAL LEVEL 1-5: Performed by: UROLOGY

## 2023-03-07 PROCEDURE — A9270 NON-COVERED ITEM OR SERVICE: HCPCS | Performed by: ANESTHESIOLOGY

## 2023-03-07 PROCEDURE — 700102 HCHG RX REV CODE 250 W/ 637 OVERRIDE(OP): Performed by: ANESTHESIOLOGY

## 2023-03-07 PROCEDURE — 160039 HCHG SURGERY MINUTES - EA ADDL 1 MIN LEVEL 3: Performed by: UROLOGY

## 2023-03-07 RX ORDER — EPHEDRINE SULFATE 50 MG/ML
5 INJECTION, SOLUTION INTRAVENOUS
Status: DISCONTINUED | OUTPATIENT
Start: 2023-03-07 | End: 2023-03-07 | Stop reason: HOSPADM

## 2023-03-07 RX ORDER — POLYETHYLENE GLYCOL 3350 17 G/17G
17 POWDER, FOR SOLUTION ORAL DAILY
Qty: 14 EACH | Refills: 0 | COMMUNITY

## 2023-03-07 RX ORDER — DIPHENHYDRAMINE HYDROCHLORIDE 50 MG/ML
12.5 INJECTION INTRAMUSCULAR; INTRAVENOUS
Status: DISCONTINUED | OUTPATIENT
Start: 2023-03-07 | End: 2023-03-07 | Stop reason: HOSPADM

## 2023-03-07 RX ORDER — ALBUTEROL SULFATE 2.5 MG/3ML
2.5 SOLUTION RESPIRATORY (INHALATION)
Status: DISCONTINUED | OUTPATIENT
Start: 2023-03-07 | End: 2023-03-07 | Stop reason: HOSPADM

## 2023-03-07 RX ORDER — SODIUM CHLORIDE, SODIUM LACTATE, POTASSIUM CHLORIDE, CALCIUM CHLORIDE 600; 310; 30; 20 MG/100ML; MG/100ML; MG/100ML; MG/100ML
INJECTION, SOLUTION INTRAVENOUS CONTINUOUS
Status: DISCONTINUED | OUTPATIENT
Start: 2023-03-07 | End: 2023-03-07 | Stop reason: HOSPADM

## 2023-03-07 RX ORDER — DEXAMETHASONE SODIUM PHOSPHATE 4 MG/ML
INJECTION, SOLUTION INTRA-ARTICULAR; INTRALESIONAL; INTRAMUSCULAR; INTRAVENOUS; SOFT TISSUE PRN
Status: DISCONTINUED | OUTPATIENT
Start: 2023-03-07 | End: 2023-03-07 | Stop reason: SURG

## 2023-03-07 RX ORDER — ONDANSETRON 2 MG/ML
INJECTION INTRAMUSCULAR; INTRAVENOUS PRN
Status: DISCONTINUED | OUTPATIENT
Start: 2023-03-07 | End: 2023-03-07 | Stop reason: SURG

## 2023-03-07 RX ORDER — HYDROMORPHONE HYDROCHLORIDE 1 MG/ML
0.2 INJECTION, SOLUTION INTRAMUSCULAR; INTRAVENOUS; SUBCUTANEOUS
Status: DISCONTINUED | OUTPATIENT
Start: 2023-03-07 | End: 2023-03-07 | Stop reason: HOSPADM

## 2023-03-07 RX ORDER — HALOPERIDOL 5 MG/ML
1 INJECTION INTRAMUSCULAR
Status: DISCONTINUED | OUTPATIENT
Start: 2023-03-07 | End: 2023-03-07 | Stop reason: HOSPADM

## 2023-03-07 RX ORDER — CEFAZOLIN SODIUM 1 G/3ML
INJECTION, POWDER, FOR SOLUTION INTRAMUSCULAR; INTRAVENOUS PRN
Status: DISCONTINUED | OUTPATIENT
Start: 2023-03-07 | End: 2023-03-07 | Stop reason: SURG

## 2023-03-07 RX ORDER — OXYCODONE HCL 5 MG/5 ML
10 SOLUTION, ORAL ORAL
Status: COMPLETED | OUTPATIENT
Start: 2023-03-07 | End: 2023-03-07

## 2023-03-07 RX ORDER — EPHEDRINE SULFATE 50 MG/ML
INJECTION, SOLUTION INTRAVENOUS PRN
Status: DISCONTINUED | OUTPATIENT
Start: 2023-03-07 | End: 2023-03-07 | Stop reason: SURG

## 2023-03-07 RX ORDER — OXYCODONE HYDROCHLORIDE 5 MG/1
5 TABLET ORAL EVERY 6 HOURS PRN
Qty: 10 TABLET | Refills: 0 | Status: SHIPPED | OUTPATIENT
Start: 2023-03-07 | End: 2023-03-10

## 2023-03-07 RX ORDER — KETOROLAC TROMETHAMINE 10 MG/1
10 TABLET, FILM COATED ORAL EVERY 6 HOURS PRN
Qty: 15 TABLET | Refills: 1 | Status: SHIPPED | OUTPATIENT
Start: 2023-03-07 | End: 2023-03-10

## 2023-03-07 RX ORDER — HYDRALAZINE HYDROCHLORIDE 20 MG/ML
5 INJECTION INTRAMUSCULAR; INTRAVENOUS
Status: DISCONTINUED | OUTPATIENT
Start: 2023-03-07 | End: 2023-03-07 | Stop reason: HOSPADM

## 2023-03-07 RX ORDER — HYDROMORPHONE HYDROCHLORIDE 1 MG/ML
0.1 INJECTION, SOLUTION INTRAMUSCULAR; INTRAVENOUS; SUBCUTANEOUS
Status: DISCONTINUED | OUTPATIENT
Start: 2023-03-07 | End: 2023-03-07 | Stop reason: HOSPADM

## 2023-03-07 RX ORDER — LABETALOL HYDROCHLORIDE 5 MG/ML
5 INJECTION, SOLUTION INTRAVENOUS
Status: DISCONTINUED | OUTPATIENT
Start: 2023-03-07 | End: 2023-03-07 | Stop reason: HOSPADM

## 2023-03-07 RX ORDER — HYDROMORPHONE HYDROCHLORIDE 1 MG/ML
0.4 INJECTION, SOLUTION INTRAMUSCULAR; INTRAVENOUS; SUBCUTANEOUS
Status: DISCONTINUED | OUTPATIENT
Start: 2023-03-07 | End: 2023-03-07 | Stop reason: HOSPADM

## 2023-03-07 RX ORDER — OXYCODONE HCL 5 MG/5 ML
5 SOLUTION, ORAL ORAL
Status: COMPLETED | OUTPATIENT
Start: 2023-03-07 | End: 2023-03-07

## 2023-03-07 RX ORDER — LIDOCAINE HYDROCHLORIDE 20 MG/ML
INJECTION, SOLUTION EPIDURAL; INFILTRATION; INTRACAUDAL; PERINEURAL PRN
Status: DISCONTINUED | OUTPATIENT
Start: 2023-03-07 | End: 2023-03-07 | Stop reason: SURG

## 2023-03-07 RX ORDER — SODIUM CHLORIDE, SODIUM LACTATE, POTASSIUM CHLORIDE, CALCIUM CHLORIDE 600; 310; 30; 20 MG/100ML; MG/100ML; MG/100ML; MG/100ML
INJECTION, SOLUTION INTRAVENOUS CONTINUOUS
Status: ACTIVE | OUTPATIENT
Start: 2023-03-07 | End: 2023-03-07

## 2023-03-07 RX ORDER — KETOROLAC TROMETHAMINE 30 MG/ML
30 INJECTION, SOLUTION INTRAMUSCULAR; INTRAVENOUS ONCE
Status: COMPLETED | OUTPATIENT
Start: 2023-03-07 | End: 2023-03-07

## 2023-03-07 RX ORDER — PHENAZOPYRIDINE HYDROCHLORIDE 100 MG/1
100 TABLET, FILM COATED ORAL 3 TIMES DAILY PRN
Qty: 6 TABLET | Refills: 0 | COMMUNITY

## 2023-03-07 RX ORDER — ONDANSETRON 2 MG/ML
4 INJECTION INTRAMUSCULAR; INTRAVENOUS
Status: COMPLETED | OUTPATIENT
Start: 2023-03-07 | End: 2023-03-07

## 2023-03-07 RX ADMIN — CEFAZOLIN 2 G: 330 INJECTION, POWDER, FOR SOLUTION INTRAMUSCULAR; INTRAVENOUS at 17:28

## 2023-03-07 RX ADMIN — HYDROMORPHONE HYDROCHLORIDE 0.4 MG: 1 INJECTION, SOLUTION INTRAMUSCULAR; INTRAVENOUS; SUBCUTANEOUS at 19:11

## 2023-03-07 RX ADMIN — FENTANYL CITRATE 25 MCG: 50 INJECTION, SOLUTION INTRAMUSCULAR; INTRAVENOUS at 18:45

## 2023-03-07 RX ADMIN — SODIUM CHLORIDE, POTASSIUM CHLORIDE, SODIUM LACTATE AND CALCIUM CHLORIDE: 600; 310; 30; 20 INJECTION, SOLUTION INTRAVENOUS at 14:27

## 2023-03-07 RX ADMIN — EPHEDRINE SULFATE 10 MG: 50 INJECTION, SOLUTION INTRAVENOUS at 17:30

## 2023-03-07 RX ADMIN — FENTANYL CITRATE 25 MCG: 50 INJECTION, SOLUTION INTRAMUSCULAR; INTRAVENOUS at 18:35

## 2023-03-07 RX ADMIN — ONDANSETRON 4 MG: 2 INJECTION, SOLUTION INTRAMUSCULAR; INTRAVENOUS at 18:48

## 2023-03-07 RX ADMIN — HYDROMORPHONE HYDROCHLORIDE 0.4 MG: 1 INJECTION, SOLUTION INTRAMUSCULAR; INTRAVENOUS; SUBCUTANEOUS at 19:00

## 2023-03-07 RX ADMIN — FENTANYL CITRATE 25 MCG: 50 INJECTION, SOLUTION INTRAMUSCULAR; INTRAVENOUS at 18:28

## 2023-03-07 RX ADMIN — PROPOFOL 200 MG: 10 INJECTION, EMULSION INTRAVENOUS at 17:26

## 2023-03-07 RX ADMIN — ONDANSETRON 4 MG: 2 INJECTION INTRAMUSCULAR; INTRAVENOUS at 17:56

## 2023-03-07 RX ADMIN — HYDROMORPHONE HYDROCHLORIDE 0.2 MG: 1 INJECTION, SOLUTION INTRAMUSCULAR; INTRAVENOUS; SUBCUTANEOUS at 19:27

## 2023-03-07 RX ADMIN — OXYCODONE HYDROCHLORIDE 10 MG: 5 SOLUTION ORAL at 18:27

## 2023-03-07 RX ADMIN — DEXAMETHASONE SODIUM PHOSPHATE 8 MG: 4 INJECTION, SOLUTION INTRA-ARTICULAR; INTRALESIONAL; INTRAMUSCULAR; INTRAVENOUS; SOFT TISSUE at 17:28

## 2023-03-07 RX ADMIN — FENTANYL CITRATE 50 MCG: 50 INJECTION, SOLUTION INTRAMUSCULAR; INTRAVENOUS at 17:26

## 2023-03-07 RX ADMIN — LIDOCAINE HYDROCHLORIDE 50 MG: 20 INJECTION, SOLUTION EPIDURAL; INFILTRATION; INTRACAUDAL at 17:26

## 2023-03-07 RX ADMIN — FENTANYL CITRATE 50 MCG: 50 INJECTION, SOLUTION INTRAMUSCULAR; INTRAVENOUS at 17:45

## 2023-03-07 RX ADMIN — FENTANYL CITRATE 25 MCG: 50 INJECTION, SOLUTION INTRAMUSCULAR; INTRAVENOUS at 18:43

## 2023-03-07 RX ADMIN — KETOROLAC TROMETHAMINE 30 MG: 30 INJECTION, SOLUTION INTRAMUSCULAR; INTRAVENOUS at 20:12

## 2023-03-07 ASSESSMENT — PAIN DESCRIPTION - PAIN TYPE
TYPE: SURGICAL PAIN

## 2023-03-07 ASSESSMENT — FIBROSIS 4 INDEX
FIB4 SCORE: 1.346938775510204082
FIB4 SCORE: 1.346938775510204082

## 2023-03-08 NOTE — DISCHARGE INSTRUCTIONS
HOME CARE INSTRUCTIONS    ACTIVITY: Rest and take it easy for the first 24 hours.  A responsible adult is recommended to remain with you during that time.  It is normal to feel sleepy.  We encourage you to not do anything that requires balance, judgment or coordination.    FOR 24 HOURS DO NOT:  Drive, operate machinery or run household appliances.  Drink beer or alcoholic beverages.  Make important decisions or sign legal documents.    SPECIAL INSTRUCTIONS:   DR. SALCEDO' DISCHARGE INSTRUCTIONS FOLLOWING   URETEROSCOPY AND LASER LITHOTRIPSY      DIET:  You can resume your regular diet. We encourage you to eat well-balanced and nutritious meals.      ACTIVITY:  Please restrain from strenuous activity or heavy lifting (more than 20 pounds) for the next week.  Please walk daily as much as tolerated, making exercise a part of your daily life. Do not drive while using narcotics for pain control. You may resumes showering and bathing without any restrictions.     WOUND CARE:  1. You have no dressing or open wounds to manage.   2. You have no internal stent which means you do not need follow up for removal which is great news!     MEDICATIONS:  - Please use an over the counter antiinflammatory (ie Ibuprofen, naproxen, Ketoralac) and/or Tylenol for pain control as needed.  - You may take 3 days of pyridium as prescribed for numbing the bladder and burning with urination.  - If you have severe pain refractory to Ibuprofen you may use Oxycodone for pain control as well as prescribed. If taking a narcotic, please use a stool softener like miralax or colace to prevent constipation.  - If you are using aspirin, Plavix, or coumadin, please don't restart these medications until 1 day after your discharge if you are not having large amounts of blood in the urine.    FOLLOW-UP:  Because you have NO stent, you need no follow up to remove this.  We will have you f/u in approximately 8 weeks with a new renal and bladder ultrasound. If  you have not heard from us in 1-2 business days, please call [phone] to schedule your follow-up appointment. You may also contact this number if you have questions or concerns that can be answered by Dr. Alvarado' staff.      WARNING SIGNS:  Fever greater than 101 degrees Fahrenheit, chills, nausea or vomiting, Large amount of clots in urine that make it difficult to urinate or for urine to drain from garrido, increasing pain, or abdominal swelling. If you are experiencing these symptoms, call the Urology Clinic or go to your local PCP or emergency room.    It is normal to see blood in your urine for up to 2 weeks even from surgery. The urine may clear up entirely, and then turn bloody again a few days later depending on your activity level; do not be alarmed. However, if you experience severe pain or tenderness, have a lot of increased bleeding, or find that you are unable to urinate because of large clots, please notify your doctor immediately     MEDICAL HELP DURING NORMAL BUSINESS HOURS:  Between the hours of 8 AM and 5 PM, please call [phone] to speak with Dr. Shekhar Alvarado’ staff.     MEDICAL HELP AFTER HOURS:  If you have a serious emergency such as chest pain, shortness of breath, relentless pain you should call 461. For other urgent problems after hours you may contact the urology physician on call by phoning the [phone]. You may also visit the Emergency room at local hospital for help.     For non-emergent problems such as prescription refills or routine questions, please do your best to contact us during normal business hours. This after-hours number should be used for urgent or emergent questions only.         Shekhar Alvarado M.D.   [address] [place] [address]   [phone]           DIET: To avoid nausea, slowly advance diet as tolerated, avoiding spicy or greasy foods for the first day.  Add more substantial food to your diet according to your physician's instructions.  Babies can be  fed formula or breast milk as soon as they are hungry.  INCREASE FLUIDS AND FIBER TO AVOID CONSTIPATION.      MEDICATIONS: Resume taking daily medication.  Take prescribed pain medication with food.  If no medication is prescribed, you may take non-aspirin pain medication if needed.  PAIN MEDICATION CAN BE VERY CONSTIPATING.  Take a stool softener or laxative such as senokot, pericolace, or milk of magnesia if needed.    Prescription given for roxicodone.  Last pain medication given at ___________.    A follow-up appointment should be arranged with your doctor in 1-2 weeks; call to schedule.    You should CALL YOUR PHYSICIAN if you develop:  Fever greater than 101 degrees F.  Pain not relieved by medication, or persistent nausea or vomiting.  Excessive bleeding (blood soaking through dressing) or unexpected drainage from the wound.  Extreme redness or swelling around the incision site, drainage of pus or foul smelling drainage.  Inability to urinate or empty your bladder within 8 hours.  Problems with breathing or chest pain.    You should call 911 if you develop problems with breathing or chest pain.  If you are unable to contact your doctor or surgical center, you should go to the nearest emergency room or urgent care center.  Physician's telephone #: [phone] Dr. Alvarado    MILD FLU-LIKE SYMPTOMS ARE NORMAL.  YOU MAY EXPERIENCE GENERALIZED MUSCLE ACHES, THROAT IRRITATION, HEADACHE AND/OR SOME NAUSEA.    If any questions arise, call your doctor.  If your doctor is not available, please feel free to call the Surgical Center at [phone].  The Center is open Monday through Friday from 7AM to 7PM.      A registered nurse may call you a few days after your surgery to see how you are doing after your procedure.    You may also receive a survey in the mail within the next two weeks and we ask that you take a few moments to complete the survey and return it to us.  Our goal is to provide you with very good care  and we value your comments.     Depression / Suicide Risk    As you are discharged from this [facility] facility, it is important to learn how to keep safe from harming yourself.    Recognize the warning signs:  Abrupt changes in personality, positive or negative- including increase in energy   Giving away possessions  Change in eating patterns- significant weight changes-  positive or negative  Change in sleeping patterns- unable to sleep or sleeping all the time   Unwillingness or inability to communicate  Depression  Unusual sadness, discouragement and loneliness  Talk of wanting to die  Neglect of personal appearance   Rebelliousness- reckless behavior  Withdrawal from people/activities they love  Confusion- inability to concentrate     If you or a loved one observes any of these behaviors or has concerns about self-harm, here's what you can do:  Talk about it- your feelings and reasons for harming yourself  Remove any means that you might use to hurt yourself (examples: pills, rope, extension cords, firearm)  Get professional help from the community (Mental Health, Substance Abuse, psychological counseling)  Do not be alone:Call your Safe Contact- someone whom you trust who will be there for you.  Call your local CRISIS HOTLINE 945-3223 or 055-406-5200  Call your local Children's Mobile Crisis Response Team [place] (826) 832-8760 or www.WindSim  Call the toll free National Suicide Prevention Hotlines   National Suicide Prevention Lifeline 119-748-UCVK (9080)  National Hope Line Network 800-SUICIDE (183-0415)    I acknowledge receipt and understanding of these Home Care instructions.

## 2023-03-08 NOTE — ANESTHESIA PROCEDURE NOTES
Airway    Date/Time: 3/7/2023 5:27 PM  Performed by: Db Stoner M.D.  Authorized by: Db Stoner M.D.     Location:  OR  Urgency:  Elective  Indications for Airway Management:  Anesthesia      Spontaneous Ventilation: absent    Sedation Level:  Deep  Preoxygenated: Yes    Final Airway Type:  Supraglottic airway  Final Supraglottic Airway:  Standard LMA    SGA Size:  4  Number of Attempts at Approach:  1

## 2023-03-08 NOTE — OP REPORT
Urologic Surgery Operative Report     Pre-op Diagnosis: Left nephrolithiasis  Left ureterolithiasis  Left hydronephrosis  Left renal colick  Status post left ureteral stent placement 2 weeks ago   Post-op Diagnosis: Same as above   Procedure: - Cystoscopy, Left ureteroscopy with basketing and removal of stone, JJ stent: [id number]   Surgeon: Surgeon(s) and Role:     * Shekhar Alvarado M.D. - Primary   Assistant: Circulator: Mykel Blanco R.N.  Scrub Person: Kira Marroquin  Radiology Technologist: Ronna Chilel   Anesthesia: General, Garol/Elory  Anesthesiologist: Db Stoner M.D.; Opal Hernandez M.D.   Estimated Blood Loss: * No blood loss amount entered *   IV fluids <1L Crystalloid    Specimens: 1. Left Stone for chemical analysis    Complications: None   Condition: Stable, procedure well tolerated    Drains: 1. Left No stent left .   2. No garrido   Disposition:  1. PACU, discharge after voiding  2. f/u 8 weeks with renal bladder ultrasound   Findings 1.  0.3 cm stone at Left proximal ureter pushed up in the upper pole kidney wire was basket and removed intact.  There is additionally 1 mm Steve's plaque in a lower pole, and this was unable to be fully vascular was freed and should pass.  2.  No signs of infection      Indication:   67-year-old male with history of left ureteral stone who was stented 2 weeks ago after aborted ureteroscopy as the ureter was too narrow to reach the stone..  After a full discussion of alternatives, risks, and benefits the patient consented to proceeding with Ureteroscopy, laser lithotripsy and possible JJ stent placement on the respective side.  He understands the risk of inability to access ureter, the need for second procedures, the possibility of negative ureteroscopy, that he may have stent discomfort until this is removed, bleeding, infection, ureteral injury or stricture, bladder injury, post op urinary retention requiring garrido catheter, and the general pulmonary and  cardiovascular risks associated with anesthesia.     Procedure Details:   The patient was taken to operating room and placed on table in supine position.  Ancef 22 was administered prior to the start of the procedure based on previous urine cultures. Sequential compression devices were placed for deep venous thrombosis prophylaxis. After induction of general anesthesia, both legs were placed in Terrance stirrups in the standard lithotomy position.  A timeout was held confirming the correct patient, procedure and laterality.   The perineal area was prepped and draped in a sterile fashion. A 22 Estonian rigid cystoscope was inserted into the urethra and the bladder was emptied and then distended with sterile saline. Urethral sounds were not needed to dilate the urethral meatus. Cystoscopy revealed normal bladder mucosa, orthotopic ureteral orifices, and no evidence of infection.    We passed a wire through the ureteral orifice of the respective side into the renal pelvis which was visualized under fluoroscopic vision.      A [id number] ureteral access sheath was then placed over one of the  wires to desired position in left ureter, and wire was removed.  We inserted the flexible ureteroscope and located stone from pushing the upper pole where it was basketed and removed intact.  Additionally found a small 1 mm plaque in the lower pole calyx, and use a basket to free this, however it did fall out and was wedged in a position where he could just not vascular after about 10 attempted minutes.  I did back out the scope serving the ureter showing no stones or stone debris within it.  The ureter was nicely patent without significant edema and decides not leave a stent.  Patient was awoken from anesthesia brought to the recovery in stable condition.           Shekhar Alvarado M.D.   [address] [address]   [phone]

## 2023-03-08 NOTE — OR NURSING
1808- Pt arrives to PACU from OR on 4L of oxygen via mask, OPA removed. Report received. No dressing, pt wife called for update, no answer at this time.     1817- Pt denies pain and nausea.     1830- Pt states pain is getting worse, medicated per MAR. Pt wife Janene updated on POC and pt status.     1943- Report called to Raeann VILLAFANA, pt states pain 3/10 and tolerable.

## 2023-03-08 NOTE — ANESTHESIA PREPROCEDURE EVALUATION
Case: [id number] Date/Time: 03/07/23 1708    Procedures:       CYSTOSCOPY WITH LEFT URETEROSCOPY WITH LITHOTRIPSY WITH POSSIBLE INSERTION OF LEFT URETERAL STONE      URETEROSCOPY      LITHOTRIPSY, USING LASER    Pre-op diagnosis: KIDNEY STONE    Location: [facility] OR 10 / SURGERY [facility]    Surgeons: Shekhar Alvarado M.D.          Relevant Problems   PULMONARY   (positive) Asthma      GI   (positive) GERD (gastroesophageal reflux disease)         (positive) LUDMILA (acute kidney injury) (HCC)   (positive) Nephrolithiasis       Physical Exam    Airway   Mallampati: II  TM distance: >3 FB  Neck ROM: full       Cardiovascular - normal exam  Rhythm: regular  Rate: normal  (-) murmur     Dental - normal exam           Pulmonary - normal exam  Breath sounds clear to auscultation     Abdominal    Neurological - normal exam                 Anesthesia Plan    ASA 2       Plan - general       Airway plan will be LMA          Induction: intravenous    Postoperative Plan: Postoperative administration of opioids is intended.    Pertinent diagnostic labs and testing reviewed    Informed Consent:    Anesthetic plan and risks discussed with patient.    Use of blood products discussed with: patient whom consented to blood products.

## 2023-03-08 NOTE — OR NURSING
Pt was able to void. Discharge instructions including prescriptions and follow up appointments were discussed with pt and wife. Pt's IV was discontinued and pt was given all belongings. Pt had no other questions. Pt pushed out via wheelchair.

## 2023-03-08 NOTE — OR NURSING
Dr. Alvarado contacted in regard to pt wanting to stay due to pain getting as high as 7/10. Pt and his wife are worried that his pain will go back up and they don't have any options at home outside of the Roxicodone. At this time his pain is a 3/10 but they are still worried about his pain escalating. Dr. Alvarado put in a Toradol prescription for the patient to use as well as the Roxicodone. Pt and his wife are not happy with the decision to still discharge but at this time his pain is 3/10, which is acceptable to him.

## 2023-03-08 NOTE — ANESTHESIA POSTPROCEDURE EVALUATION
Patient: Keyur Fernandez    Procedure Summary     Date: 03/07/23 Room / Location: [place] [address] / SURGERY [facility]    Anesthesia Start: 1721 Anesthesia Stop: 1809    Procedures:       CYSTOSCOPY WITH LEFT URETEROSCOPY WITH BASKETING OF LEFT URETERAL STONE (Left: Ureter)      URETEROSCOPY (Left: Ureter) Diagnosis: (LEFT KIDNEY STONE)    Surgeons: Shekhar Alvarado M.D. Responsible Provider: Opal Hernandez M.D.    Anesthesia Type: general ASA Status: 2          Final Anesthesia Type: general  Last vitals  BP   Blood Pressure : (!) 161/106    Temp   36.7 °C (98.1 °F)    Pulse   63   Resp   14    SpO2   97 %      Anesthesia Post Evaluation    Patient location during evaluation: PACU  Patient participation: complete - patient participated  Level of consciousness: awake and alert    Airway patency: patent  Anesthetic complications: no  Cardiovascular status: hemodynamically stable  Respiratory status: acceptable  Hydration status: euvolemic    PONV: none          No notable events documented.     Nurse Pain Score: 6 (NPRS)

## 2023-03-08 NOTE — ANESTHESIA TIME REPORT
Anesthesia Start and Stop Event Times     Date Time Event    3/7/2023 1700 Ready for Procedure     1721 Anesthesia Start     1809 Anesthesia Stop        Responsible Staff  03/07/23    Name Role Begin End    Db Stoner M.D. Anesth 1721 1735    Opal Hernandez M.D. Anesth 1735 1809        Overtime Reason:  no overtime (within assigned shift)    Comments:

## (undated) DEVICE — SPONGE GAUZESTER 4 X 4 4PLY - (128PK/CA)

## (undated) DEVICE — PACK CYSTO III (2EA/CA)

## (undated) DEVICE — CONNECTOR HOSE NEPTUNE FOR CYSTO ROOM

## (undated) DEVICE — WATER IRRIG. STER 3000 ML - (4/CA)

## (undated) DEVICE — WIRE GUIDE SENSOR DUAL FLEX - 5/BX

## (undated) DEVICE — LACTATED RINGERS INJ 1000 ML - (14EA/CA 60CA/PF)

## (undated) DEVICE — TUBING CLEARLINK DUO-VENT - C-FLO (48EA/CA)

## (undated) DEVICE — SODIUM CHL. IRRIGATION 0.9% 3000ML (4EA/CA 65CA/PF)

## (undated) DEVICE — SET EXTENSION WITH 2 PORTS (48EA/CA) ***PART #2C8610 IS A SUBSTITUTE*****

## (undated) DEVICE — FIBER LASER MOSES 200 UM (1/EA)

## (undated) DEVICE — SLEEVE VASO CALF MED - (10PR/CA)

## (undated) DEVICE — GLOVE BIOGEL PI INDICATOR SZ 6.5 SURGICAL PF LF - (50/BX 4BX/CA)

## (undated) DEVICE — CONTAINER SPECIMEN BAG OR - STERILE 4 OZ W/LID (100EA/CA)

## (undated) DEVICE — SCOPE DIGITAL URETEROSCOPE DISPOSABLE (10EA/PK)

## (undated) DEVICE — SODIUM CHL IRRIGATION 0.9% 1000ML (12EA/CA)

## (undated) DEVICE — SLEEVE, VASO, THIGH, MED

## (undated) DEVICE — FIBER LASER MOSES 365 UM (1/EA)

## (undated) DEVICE — GLOVE BIOGEL PI INDICATOR SZ 7.5 SURGICAL PF LF -(50/BX 4BX/CA)

## (undated) DEVICE — GOWN WARMING STANDARD FLEX - (30/CA)

## (undated) DEVICE — BASKET ZERO 1.9FR X 120CM

## (undated) DEVICE — SENSOR OXIMETER ADULT SPO2 RD SET (20EA/BX)

## (undated) DEVICE — TOWELS CLOTH SURGICAL - (4/PK 20PK/CA)

## (undated) DEVICE — SUCTION INSTRUMENT YANKAUER BULBOUS TIP W/O VENT (50EA/CA)

## (undated) DEVICE — DRAPE UNDER BUTTOCKS FLUID - (20/CA)

## (undated) DEVICE — COVER LIGHT HANDLE ALC PLUS DISP (18EA/BX)

## (undated) DEVICE — GLOVE BIOGEL PI INDICATOR SZ 8.5 SURGICAL PF LF - (50PR/BX 4BX/CA)

## (undated) DEVICE — CANISTER SUCTION 3000ML MECHANICAL FILTER AUTO SHUTOFF MEDI-VAC NONSTERILE LF DISP  (40EA/CA)

## (undated) DEVICE — SET LEADWIRE 5 LEAD BEDSIDE DISPOSABLE ECG (1SET OF 5/EA)

## (undated) DEVICE — COVER FOOT UNIVERSAL DISP. - (25EA/CA)

## (undated) DEVICE — TOWEL STOP TIMEOUT SAFETY FLAG (40EA/CA)

## (undated) DEVICE — SHEATH NAVIGATOR 11/13 X 36 URETERAL ACCESS (5EA/BX)

## (undated) DEVICE — BAG URODRAIN WITH TUBING - (20/CA)

## (undated) DEVICE — TUBE CONNECT SUCTION CLEAR 120 X 1/4" (50EA/CA)"

## (undated) DEVICE — SHEATH ACCESS URETERAL NAVIGATOR L36 CM OD13-15 FR(5EA/BX)

## (undated) DEVICE — GLOVE BIOGEL SZ 6 PF LATEX - (50EA/BX 4BX/CA)